# Patient Record
Sex: FEMALE | Race: WHITE | NOT HISPANIC OR LATINO | Employment: OTHER | URBAN - NONMETROPOLITAN AREA
[De-identification: names, ages, dates, MRNs, and addresses within clinical notes are randomized per-mention and may not be internally consistent; named-entity substitution may affect disease eponyms.]

---

## 2023-07-17 LAB
ALANINE AMINOTRANSFERASE (SGPT) (U/L) IN SER/PLAS: 30 U/L (ref 7–45)
ALBUMIN (G/DL) IN SER/PLAS: 4.7 G/DL (ref 3.4–5)
ALKALINE PHOSPHATASE (U/L) IN SER/PLAS: 51 U/L (ref 33–136)
ANION GAP IN SER/PLAS: 15 MMOL/L (ref 10–20)
ASPARTATE AMINOTRANSFERASE (SGOT) (U/L) IN SER/PLAS: 28 U/L (ref 9–39)
BASOPHILS (10*3/UL) IN BLOOD BY AUTOMATED COUNT: 0.03 X10E9/L (ref 0–0.1)
BASOPHILS/100 LEUKOCYTES IN BLOOD BY AUTOMATED COUNT: 0.8 % (ref 0–2)
BILIRUBIN TOTAL (MG/DL) IN SER/PLAS: 0.5 MG/DL (ref 0–1.2)
C REACTIVE PROTEIN (MG/L) IN SER/PLAS: <0.1 MG/DL
CALCIDIOL (25 OH VITAMIN D3) (NG/ML) IN SER/PLAS: 63 NG/ML
CALCIUM (MG/DL) IN SER/PLAS: 9.6 MG/DL (ref 8.6–10.3)
CARBON DIOXIDE, TOTAL (MMOL/L) IN SER/PLAS: 25 MMOL/L (ref 21–32)
CHLORIDE (MMOL/L) IN SER/PLAS: 108 MMOL/L (ref 98–107)
CHOLESTEROL (MG/DL) IN SER/PLAS: 174 MG/DL (ref 0–199)
CHOLESTEROL IN HDL (MG/DL) IN SER/PLAS: 53 MG/DL
CHOLESTEROL/HDL RATIO: 3.3
CREATININE (MG/DL) IN SER/PLAS: 0.77 MG/DL (ref 0.5–1.05)
EOSINOPHILS (10*3/UL) IN BLOOD BY AUTOMATED COUNT: 0.08 X10E9/L (ref 0–0.7)
EOSINOPHILS/100 LEUKOCYTES IN BLOOD BY AUTOMATED COUNT: 2.3 % (ref 0–6)
ERYTHROCYTE DISTRIBUTION WIDTH (RATIO) BY AUTOMATED COUNT: 13.3 % (ref 11.5–14.5)
ERYTHROCYTE MEAN CORPUSCULAR HEMOGLOBIN CONCENTRATION (G/DL) BY AUTOMATED: 32.2 G/DL (ref 32–36)
ERYTHROCYTE MEAN CORPUSCULAR VOLUME (FL) BY AUTOMATED COUNT: 93 FL (ref 80–100)
ERYTHROCYTES (10*6/UL) IN BLOOD BY AUTOMATED COUNT: 5.29 X10E12/L (ref 4–5.2)
ESTIMATED AVERAGE GLUCOSE FOR HBA1C: 108 MG/DL
GFR FEMALE: 85 ML/MIN/1.73M2
GLUCOSE (MG/DL) IN SER/PLAS: 90 MG/DL (ref 74–99)
HEMATOCRIT (%) IN BLOOD BY AUTOMATED COUNT: 49.1 % (ref 36–46)
HEMOGLOBIN (G/DL) IN BLOOD: 15.8 G/DL (ref 12–16)
HEMOGLOBIN A1C/HEMOGLOBIN TOTAL IN BLOOD: 5.4 %
IMMATURE GRANULOCYTES/100 LEUKOCYTES IN BLOOD BY AUTOMATED COUNT: 0.3 % (ref 0–0.9)
LDL: 96 MG/DL (ref 0–99)
LEUKOCYTES (10*3/UL) IN BLOOD BY AUTOMATED COUNT: 3.5 X10E9/L (ref 4.4–11.3)
LYMPHOCYTES (10*3/UL) IN BLOOD BY AUTOMATED COUNT: 1.32 X10E9/L (ref 1.2–4.8)
LYMPHOCYTES/100 LEUKOCYTES IN BLOOD BY AUTOMATED COUNT: 37.4 % (ref 13–44)
MONOCYTES (10*3/UL) IN BLOOD BY AUTOMATED COUNT: 0.2 X10E9/L (ref 0.1–1)
MONOCYTES/100 LEUKOCYTES IN BLOOD BY AUTOMATED COUNT: 5.7 % (ref 2–10)
NEUTROPHILS (10*3/UL) IN BLOOD BY AUTOMATED COUNT: 1.89 X10E9/L (ref 1.2–7.7)
NEUTROPHILS/100 LEUKOCYTES IN BLOOD BY AUTOMATED COUNT: 53.5 % (ref 40–80)
PLATELETS (10*3/UL) IN BLOOD AUTOMATED COUNT: 154 X10E9/L (ref 150–450)
POTASSIUM (MMOL/L) IN SER/PLAS: 4.1 MMOL/L (ref 3.5–5.3)
PROTEIN TOTAL: 7.1 G/DL (ref 6.4–8.2)
SEDIMENTATION RATE, ERYTHROCYTE: 5 MM/H (ref 0–30)
SODIUM (MMOL/L) IN SER/PLAS: 144 MMOL/L (ref 136–145)
THYROTROPIN (MIU/L) IN SER/PLAS BY DETECTION LIMIT <= 0.05 MIU/L: 1.31 MIU/L (ref 0.44–3.98)
TRIGLYCERIDE (MG/DL) IN SER/PLAS: 127 MG/DL (ref 0–149)
UREA NITROGEN (MG/DL) IN SER/PLAS: 12 MG/DL (ref 6–23)
VLDL: 25 MG/DL (ref 0–40)

## 2023-07-25 PROBLEM — M81.0 OSTEOPOROSIS: Status: ACTIVE | Noted: 2023-07-25

## 2023-07-25 PROBLEM — J02.9 ACUTE PHARYNGITIS: Status: ACTIVE | Noted: 2023-07-25

## 2023-07-25 PROBLEM — J30.9 ALLERGIC RHINITIS: Status: ACTIVE | Noted: 2023-07-25

## 2023-07-25 PROBLEM — K21.9 GERD (GASTROESOPHAGEAL REFLUX DISEASE): Status: ACTIVE | Noted: 2023-07-25

## 2023-07-25 PROBLEM — L30.9 DERMATITIS: Status: ACTIVE | Noted: 2023-07-25

## 2023-07-25 PROBLEM — R92.8 ABNORMAL MAMMOGRAM OF LEFT BREAST: Status: ACTIVE | Noted: 2023-07-25

## 2023-07-25 PROBLEM — E55.9 VITAMIN D DEFICIENCY: Status: ACTIVE | Noted: 2023-07-25

## 2023-07-25 PROBLEM — J20.9 ACUTE BRONCHITIS: Status: ACTIVE | Noted: 2023-07-25

## 2023-07-25 PROBLEM — R39.9 UTI SYMPTOMS: Status: ACTIVE | Noted: 2023-07-25

## 2023-07-25 PROBLEM — E78.00 HYPERCHOLESTEROLEMIA: Status: ACTIVE | Noted: 2023-07-25

## 2023-07-25 PROBLEM — M06.9 RA (RHEUMATOID ARTHRITIS) (MULTI): Status: ACTIVE | Noted: 2023-07-25

## 2023-07-25 PROBLEM — J10.1 INFLUENZA B: Status: ACTIVE | Noted: 2023-07-25

## 2023-07-25 PROBLEM — J01.90 ACUTE SINUSITIS: Status: ACTIVE | Noted: 2023-07-25

## 2023-07-25 PROBLEM — J02.9 SORE THROAT: Status: ACTIVE | Noted: 2023-07-25

## 2023-07-25 PROBLEM — R73.9 HYPERGLYCEMIA: Status: ACTIVE | Noted: 2023-07-25

## 2023-07-25 RX ORDER — ASCORBATE CALCIUM 500 MG
1 TABLET ORAL DAILY
COMMUNITY
Start: 2021-05-10 | End: 2024-02-12 | Stop reason: ALTCHOICE

## 2023-07-25 RX ORDER — LORATADINE 10 MG/1
1 TABLET ORAL DAILY
COMMUNITY
Start: 2023-05-15

## 2023-07-25 RX ORDER — MULTIVITAMIN
TABLET ORAL
COMMUNITY

## 2023-07-25 RX ORDER — METRONIDAZOLE 10 MG/G
GEL TOPICAL
COMMUNITY
Start: 2020-08-31

## 2023-07-25 RX ORDER — ASCORBIC ACID 250 MG
TABLET ORAL
COMMUNITY

## 2023-07-25 RX ORDER — PNEUMOCOCCAL VACCINE POLYVALENT 25; 25; 25; 25; 25; 25; 25; 25; 25; 25; 25; 25; 25; 25; 25; 25; 25; 25; 25; 25; 25; 25; 25 UG/.5ML; UG/.5ML; UG/.5ML; UG/.5ML; UG/.5ML; UG/.5ML; UG/.5ML; UG/.5ML; UG/.5ML; UG/.5ML; UG/.5ML; UG/.5ML; UG/.5ML; UG/.5ML; UG/.5ML; UG/.5ML; UG/.5ML; UG/.5ML; UG/.5ML; UG/.5ML; UG/.5ML; UG/.5ML; UG/.5ML
INJECTION, SOLUTION INTRAMUSCULAR; SUBCUTANEOUS
COMMUNITY
Start: 2023-03-03

## 2023-07-25 RX ORDER — HYDROXYCHLOROQUINE SULFATE 200 MG/1
1 TABLET, FILM COATED ORAL DAILY
COMMUNITY
Start: 2013-04-01 | End: 2024-05-13 | Stop reason: SDUPTHER

## 2023-07-25 RX ORDER — VIT C/E/ZN/COPPR/LUTEIN/ZEAXAN 250MG-90MG
CAPSULE ORAL
COMMUNITY

## 2023-07-25 RX ORDER — ROSUVASTATIN CALCIUM 40 MG/1
1 TABLET, COATED ORAL DAILY
COMMUNITY
Start: 2013-04-01 | End: 2023-08-13 | Stop reason: SDUPTHER

## 2023-07-25 RX ORDER — OMEPRAZOLE 40 MG/1
1 CAPSULE, DELAYED RELEASE ORAL DAILY
COMMUNITY
Start: 2015-11-30 | End: 2023-08-10 | Stop reason: SDUPTHER

## 2023-08-10 ENCOUNTER — OFFICE VISIT (OUTPATIENT)
Dept: PRIMARY CARE | Facility: CLINIC | Age: 63
End: 2023-08-10
Payer: MEDICARE

## 2023-08-10 VITALS
HEART RATE: 82 BPM | SYSTOLIC BLOOD PRESSURE: 162 MMHG | DIASTOLIC BLOOD PRESSURE: 88 MMHG | OXYGEN SATURATION: 97 % | WEIGHT: 153.8 LBS | BODY MASS INDEX: 27.24 KG/M2

## 2023-08-10 DIAGNOSIS — E78.00 HYPERCHOLESTEROLEMIA: ICD-10-CM

## 2023-08-10 DIAGNOSIS — M06.9 RHEUMATOID ARTHRITIS, INVOLVING UNSPECIFIED SITE, UNSPECIFIED WHETHER RHEUMATOID FACTOR PRESENT (MULTI): ICD-10-CM

## 2023-08-10 DIAGNOSIS — E55.9 VITAMIN D DEFICIENCY: ICD-10-CM

## 2023-08-10 DIAGNOSIS — I10 HYPERTENSION, UNSPECIFIED TYPE: Primary | ICD-10-CM

## 2023-08-10 DIAGNOSIS — R92.30 DENSE BREAST TISSUE ON MAMMOGRAM: ICD-10-CM

## 2023-08-10 DIAGNOSIS — M81.0 OSTEOPOROSIS, UNSPECIFIED OSTEOPOROSIS TYPE, UNSPECIFIED PATHOLOGICAL FRACTURE PRESENCE: ICD-10-CM

## 2023-08-10 DIAGNOSIS — K21.9 GASTROESOPHAGEAL REFLUX DISEASE, UNSPECIFIED WHETHER ESOPHAGITIS PRESENT: ICD-10-CM

## 2023-08-10 PROCEDURE — 1160F RVW MEDS BY RX/DR IN RCRD: CPT | Performed by: INTERNAL MEDICINE

## 2023-08-10 PROCEDURE — 1159F MED LIST DOCD IN RCRD: CPT | Performed by: INTERNAL MEDICINE

## 2023-08-10 PROCEDURE — 99214 OFFICE O/P EST MOD 30 MIN: CPT | Performed by: INTERNAL MEDICINE

## 2023-08-10 PROCEDURE — 3079F DIAST BP 80-89 MM HG: CPT | Performed by: INTERNAL MEDICINE

## 2023-08-10 PROCEDURE — G0446 INTENS BEHAVE THER CARDIO DX: HCPCS | Performed by: INTERNAL MEDICINE

## 2023-08-10 PROCEDURE — 1036F TOBACCO NON-USER: CPT | Performed by: INTERNAL MEDICINE

## 2023-08-10 PROCEDURE — 3077F SYST BP >= 140 MM HG: CPT | Performed by: INTERNAL MEDICINE

## 2023-08-10 PROCEDURE — 3008F BODY MASS INDEX DOCD: CPT | Performed by: INTERNAL MEDICINE

## 2023-08-10 RX ORDER — OMEPRAZOLE 40 MG/1
40 CAPSULE, DELAYED RELEASE ORAL DAILY
Qty: 90 CAPSULE | Refills: 0 | Status: SHIPPED | OUTPATIENT
Start: 2023-08-10

## 2023-08-10 NOTE — PROGRESS NOTES
Subjective   Patient ID: Zeynep Winters is a 63 y.o. female who presents for Med Management, Hyperlipidemia, GERD, and Rheumatoid Arthritis.  Hyperlipidemia    GERD  Routine follow  up    High bp at rheum office    face rosacea   sees derm     GERD stable on rx no side effects     RA stable on rx no side effects  sees rheum     cholesterol stable on rx no side effects     allergies stable on rx no side effects     vit D stable on rx no side effects     diet / exercise rev'd     check mammogram, dexa and labs    Review of Systems   All other systems reviewed and are negative.      Objective   /88   Pulse 82   Wt 69.8 kg (153 lb 12.8 oz)   SpO2 97%   BMI 27.24 kg/m²   Lab Results   Component Value Date    WBC 3.5 (L) 07/17/2023    HGB 15.8 07/17/2023    HCT 49.1 (H) 07/17/2023     07/17/2023    CHOL 174 07/17/2023    TRIG 127 07/17/2023    HDL 53.0 07/17/2023    ALT 30 07/17/2023    AST 28 07/17/2023     07/17/2023    K 4.1 07/17/2023     (H) 07/17/2023    CREATININE 0.77 07/17/2023    BUN 12 07/17/2023    CO2 25 07/17/2023    TSH 1.31 07/17/2023    HGBA1C 5.4 07/17/2023           Physical Exam  Vitals reviewed.   Constitutional:       Appearance: Normal appearance. She is normal weight.   HENT:      Head: Normocephalic and atraumatic.      Mouth/Throat:      Pharynx: No posterior oropharyngeal erythema.   Eyes:      General: No scleral icterus.     Conjunctiva/sclera: Conjunctivae normal.      Pupils: Pupils are equal, round, and reactive to light.   Cardiovascular:      Rate and Rhythm: Normal rate and regular rhythm.      Heart sounds: Normal heart sounds.   Pulmonary:      Effort: No respiratory distress.      Breath sounds: No wheezing.   Abdominal:      General: Abdomen is flat. Bowel sounds are normal. There is no distension.      Palpations: Abdomen is soft. There is no mass.      Tenderness: There is no abdominal tenderness. There is no rebound.   Musculoskeletal:         General:  Normal range of motion.      Cervical back: Normal range of motion and neck supple.   Skin:     General: Skin is warm and dry.   Neurological:      General: No focal deficit present.      Mental Status: She is alert and oriented to person, place, and time. Mental status is at baseline.   Psychiatric:         Mood and Affect: Mood normal.         Behavior: Behavior normal.         Thought Content: Thought content normal.         Judgment: Judgment normal.       Problem List Items Addressed This Visit          Cardiac and Vasculature    Hypercholesterolemia       Endocrine/Metabolic    Vitamin D deficiency       Gastrointestinal and Abdominal    GERD (gastroesophageal reflux disease)    Relevant Medications    omeprazole (PriLOSEC) 40 mg DR capsule       Multi-system (Lupus, Sarcoid)    RA (rheumatoid arthritis) (CMS/[facility])     Other Visit Diagnoses       Hypertension, unspecified type    -  Primary    BMI 27.0-27.9,adult              Assessment/Plan         Labs, mammogram rev'd    High bp at rheum office  Lifestyle changes discussed  Follow closely    face rosacea   sees derm     GERD stable on rx no side effects     RA stable on rx no side effects  osteoporosis  sees rheum     cholesterol stable on rx no side effects  I spent 15 minutes face-to-face with this individual discussing their cardiovascular risk and behavioral therapies of nutritional choices, exercise, and elimination of habits contributing to risk. We agreed on plan how they may be able to reduce their current cardiovascular risk.  Patient 10 year cardiac risk estimate calculates :    8.2   %    allergies stable on rx no side effects     vit D stable on rx no side effects    Dense breast tissue on mammogram rev'd  MRI offered pt deferred for now     diet / exercise rev'd    Mammogram 4-23  Dexa 4-23  Colonoscopy at 56 yrs  due at 66 yrs  CT chest lung cancer screening n/a  GYN n/a  immunizations rev'd  BMI 27.2    Medicare physical 2-23    Follow up 3  weeks

## 2023-08-12 DIAGNOSIS — E78.00 HYPERCHOLESTEROLEMIA: Primary | ICD-10-CM

## 2023-08-13 RX ORDER — ROSUVASTATIN CALCIUM 40 MG/1
40 TABLET, COATED ORAL DAILY
Qty: 90 TABLET | Refills: 0 | Status: SHIPPED | OUTPATIENT
Start: 2023-08-13 | End: 2023-12-28 | Stop reason: SDUPTHER

## 2023-09-12 ENCOUNTER — OFFICE VISIT (OUTPATIENT)
Dept: PRIMARY CARE | Facility: CLINIC | Age: 63
End: 2023-09-12
Payer: MEDICARE

## 2023-09-12 VITALS
DIASTOLIC BLOOD PRESSURE: 104 MMHG | OXYGEN SATURATION: 98 % | SYSTOLIC BLOOD PRESSURE: 186 MMHG | BODY MASS INDEX: 27.6 KG/M2 | HEART RATE: 80 BPM | WEIGHT: 155.8 LBS

## 2023-09-12 DIAGNOSIS — M06.9 RHEUMATOID ARTHRITIS INVOLVING MULTIPLE SITES, UNSPECIFIED WHETHER RHEUMATOID FACTOR PRESENT (MULTI): ICD-10-CM

## 2023-09-12 DIAGNOSIS — K21.9 GASTROESOPHAGEAL REFLUX DISEASE WITHOUT ESOPHAGITIS: ICD-10-CM

## 2023-09-12 DIAGNOSIS — E55.9 VITAMIN D DEFICIENCY: ICD-10-CM

## 2023-09-12 DIAGNOSIS — F41.9 ANXIETY: ICD-10-CM

## 2023-09-12 DIAGNOSIS — E78.00 HYPERCHOLESTEREMIA: ICD-10-CM

## 2023-09-12 DIAGNOSIS — I10 HYPERTENSION, UNSPECIFIED TYPE: Primary | ICD-10-CM

## 2023-09-12 PROCEDURE — 99214 OFFICE O/P EST MOD 30 MIN: CPT | Performed by: INTERNAL MEDICINE

## 2023-09-12 PROCEDURE — 1160F RVW MEDS BY RX/DR IN RCRD: CPT | Performed by: INTERNAL MEDICINE

## 2023-09-12 PROCEDURE — 3080F DIAST BP >= 90 MM HG: CPT | Performed by: INTERNAL MEDICINE

## 2023-09-12 PROCEDURE — 3008F BODY MASS INDEX DOCD: CPT | Performed by: INTERNAL MEDICINE

## 2023-09-12 PROCEDURE — 1036F TOBACCO NON-USER: CPT | Performed by: INTERNAL MEDICINE

## 2023-09-12 PROCEDURE — 1159F MED LIST DOCD IN RCRD: CPT | Performed by: INTERNAL MEDICINE

## 2023-09-12 PROCEDURE — 3077F SYST BP >= 140 MM HG: CPT | Performed by: INTERNAL MEDICINE

## 2023-09-12 RX ORDER — SERTRALINE HYDROCHLORIDE 50 MG/1
TABLET, FILM COATED ORAL
Qty: 30 TABLET | Refills: 0 | Status: SHIPPED | OUTPATIENT
Start: 2023-09-12 | End: 2023-10-10 | Stop reason: SDUPTHER

## 2023-09-12 RX ORDER — ESCITALOPRAM OXALATE 10 MG/1
10 TABLET ORAL DAILY
Qty: 30 TABLET | Refills: 0 | Status: CANCELLED | OUTPATIENT
Start: 2023-09-12 | End: 2023-12-11

## 2023-09-12 NOTE — PROGRESS NOTES
Subjective   Patient ID: Zeynep Winters is a 63 y.o. female who presents for Med Management, Hypertension, and Rheumatoid Arthritis.  HPI  Follow up bp    Hypertension  Home readings rev'd and acceptable  Gets anxious easily     face rosacea   sees derm     GERD stable on rx no side effects     RA stable on rx no side effects  osteoporosis  sees rheum     cholesterol stable on rx no side effects     vit D stable on rx no side effects     Dense breast tissue on mammogram rev'd  MRI offered pt deferred for now     diet / exercise rev'd     Mammogram 4-23  Dexa 4-23  Colonoscopy at 56 yrs  due at 66 yrs  CT chest lung cancer screening n/a  GYN n/a  immunizations rev'd  BMI 27.2     Medicare physical 2-23    Review of Systems   All other systems reviewed and are negative.      Objective   BP (!) 186/104   Pulse 80   Wt 70.7 kg (155 lb 12.8 oz)   SpO2 98%   BMI 27.60 kg/m²   Lab Results   Component Value Date    WBC 3.5 (L) 07/17/2023    HGB 15.8 07/17/2023    HCT 49.1 (H) 07/17/2023     07/17/2023    CHOL 174 07/17/2023    TRIG 127 07/17/2023    HDL 53.0 07/17/2023    ALT 30 07/17/2023    AST 28 07/17/2023     07/17/2023    K 4.1 07/17/2023     (H) 07/17/2023    CREATININE 0.77 07/17/2023    BUN 12 07/17/2023    CO2 25 07/17/2023    TSH 1.31 07/17/2023    HGBA1C 5.4 07/17/2023           Physical Exam  Vitals reviewed.   Constitutional:       Appearance: Normal appearance. She is normal weight.   HENT:      Head: Normocephalic and atraumatic.      Mouth/Throat:      Pharynx: No posterior oropharyngeal erythema.   Eyes:      General: No scleral icterus.     Conjunctiva/sclera: Conjunctivae normal.      Pupils: Pupils are equal, round, and reactive to light.   Cardiovascular:      Rate and Rhythm: Normal rate and regular rhythm.      Heart sounds: Normal heart sounds.   Pulmonary:      Effort: No respiratory distress.      Breath sounds: No wheezing.   Abdominal:      General: Abdomen is flat. Bowel  sounds are normal. There is no distension.      Palpations: Abdomen is soft. There is no mass.      Tenderness: There is no abdominal tenderness. There is no rebound.   Musculoskeletal:         General: Normal range of motion.      Cervical back: Normal range of motion and neck supple.   Skin:     General: Skin is warm and dry.   Neurological:      General: No focal deficit present.      Mental Status: She is alert and oriented to person, place, and time. Mental status is at baseline.   Psychiatric:         Mood and Affect: Mood normal.         Behavior: Behavior normal.         Thought Content: Thought content normal.         Judgment: Judgment normal.         Problem List Items Addressed This Visit          Endocrine/Metabolic    Vitamin D deficiency       Gastrointestinal and Abdominal    GERD (gastroesophageal reflux disease)       Multi-system (Lupus, Sarcoid)    RA (rheumatoid arthritis) (CMS/[facility])     Other Visit Diagnoses       Hypertension, unspecified type    -  Primary    Anxiety        Relevant Medications    sertraline (Zoloft) 50 mg tablet    Hypercholesteremia        BMI 27.0-27.9,adult              Assessment/Plan     Follow up bp    Hypertension  Home readings rev'd and acceptable  Gets anxious easily  Start zoloft 50mg 1/2 tab daily x 4 days then 1 a day  Risk / benefits rev'd  Follow bp's closely     face rosacea   sees derm     GERD stable on rx no side effects     RA stable on rx no side effects  osteoporosis  sees rheum     cholesterol stable on rx no side effects     vit D stable on rx no side effects     Dense breast tissue on mammogram rev'd  MRI offered pt deferred for now     diet / exercise rev'd     Mammogram 4-23  Dexa 4-23  Colonoscopy at 56 yrs  due at 66 yrs  CT chest lung cancer screening n/a  GYN n/a  immunizations rev'd  BMI 27.2     Medicare physical 2-23    Follow up 3 weeks

## 2023-10-10 ENCOUNTER — OFFICE VISIT (OUTPATIENT)
Dept: PRIMARY CARE | Facility: CLINIC | Age: 63
End: 2023-10-10
Payer: MEDICARE

## 2023-10-10 VITALS
HEART RATE: 64 BPM | DIASTOLIC BLOOD PRESSURE: 94 MMHG | SYSTOLIC BLOOD PRESSURE: 186 MMHG | OXYGEN SATURATION: 97 % | WEIGHT: 156.2 LBS | BODY MASS INDEX: 27.67 KG/M2

## 2023-10-10 DIAGNOSIS — E55.9 VITAMIN D DEFICIENCY: ICD-10-CM

## 2023-10-10 DIAGNOSIS — F41.9 ANXIETY: ICD-10-CM

## 2023-10-10 DIAGNOSIS — K21.9 GASTROESOPHAGEAL REFLUX DISEASE WITHOUT ESOPHAGITIS: ICD-10-CM

## 2023-10-10 DIAGNOSIS — M06.9 RHEUMATOID ARTHRITIS INVOLVING MULTIPLE SITES, UNSPECIFIED WHETHER RHEUMATOID FACTOR PRESENT (MULTI): ICD-10-CM

## 2023-10-10 DIAGNOSIS — I10 HYPERTENSION, UNSPECIFIED TYPE: Primary | ICD-10-CM

## 2023-10-10 DIAGNOSIS — E78.00 HYPERCHOLESTEREMIA: ICD-10-CM

## 2023-10-10 PROCEDURE — 1160F RVW MEDS BY RX/DR IN RCRD: CPT | Performed by: INTERNAL MEDICINE

## 2023-10-10 PROCEDURE — 3080F DIAST BP >= 90 MM HG: CPT | Performed by: INTERNAL MEDICINE

## 2023-10-10 PROCEDURE — 1036F TOBACCO NON-USER: CPT | Performed by: INTERNAL MEDICINE

## 2023-10-10 PROCEDURE — 1159F MED LIST DOCD IN RCRD: CPT | Performed by: INTERNAL MEDICINE

## 2023-10-10 PROCEDURE — 3008F BODY MASS INDEX DOCD: CPT | Performed by: INTERNAL MEDICINE

## 2023-10-10 PROCEDURE — 99214 OFFICE O/P EST MOD 30 MIN: CPT | Performed by: INTERNAL MEDICINE

## 2023-10-10 PROCEDURE — 3077F SYST BP >= 140 MM HG: CPT | Performed by: INTERNAL MEDICINE

## 2023-10-10 RX ORDER — SERTRALINE HYDROCHLORIDE 50 MG/1
TABLET, FILM COATED ORAL
Qty: 90 TABLET | Refills: 1 | Status: SHIPPED | OUTPATIENT
Start: 2023-10-10 | End: 2024-02-12 | Stop reason: SDUPTHER

## 2023-10-10 ASSESSMENT — ENCOUNTER SYMPTOMS: HYPERTENSION: 1

## 2023-10-10 NOTE — PROGRESS NOTES
Subjective   Patient ID: Zeynep Winters is a 63 y.o. female who presents for Med Management, Hypertension, and Hyperlipidemia.  Hypertension    Hyperlipidemia      Follow up bp, zoloft     Hypertension  Home readings rev'd and acceptable  Gets anxious easily  Continue zoloft feels better  no side effects     face rosacea   sees derm     GERD stable on rx no side effects     RA stable on rx no side effects  osteoporosis  sees rheum     cholesterol stable on rx no side effects     vit D stable on rx no side effects     Dense breast tissue on mammogram rev'd  MRI offered pt deferred for now     diet / exercise rev'd     Mammogram 4-23  Dexa 4-23  Colonoscopy at 56 yrs  due at 66 yrs  CT chest lung cancer screening n/a  GYN n/a  immunizations rev'd  BMI 27.6     Medicare physical 2-23    Review of Systems   All other systems reviewed and are negative.      Objective   BP (!) 186/94   Pulse 64   Wt 70.9 kg (156 lb 3.2 oz)   SpO2 97%   BMI 27.67 kg/m²   Lab Results   Component Value Date    WBC 3.5 (L) 07/17/2023    HGB 15.8 07/17/2023    HCT 49.1 (H) 07/17/2023     07/17/2023    CHOL 174 07/17/2023    TRIG 127 07/17/2023    HDL 53.0 07/17/2023    ALT 30 07/17/2023    AST 28 07/17/2023     07/17/2023    K 4.1 07/17/2023     (H) 07/17/2023    CREATININE 0.77 07/17/2023    BUN 12 07/17/2023    CO2 25 07/17/2023    TSH 1.31 07/17/2023    HGBA1C 5.4 07/17/2023           Physical Exam  Vitals reviewed.   Constitutional:       Appearance: Normal appearance. She is normal weight.   HENT:      Head: Normocephalic and atraumatic.      Mouth/Throat:      Pharynx: No posterior oropharyngeal erythema.   Eyes:      General: No scleral icterus.     Conjunctiva/sclera: Conjunctivae normal.      Pupils: Pupils are equal, round, and reactive to light.   Cardiovascular:      Rate and Rhythm: Normal rate and regular rhythm.      Heart sounds: Normal heart sounds.   Pulmonary:      Effort: No respiratory distress.       Breath sounds: No wheezing.   Abdominal:      General: Abdomen is flat. Bowel sounds are normal. There is no distension.      Palpations: Abdomen is soft. There is no mass.      Tenderness: There is no abdominal tenderness. There is no rebound.   Musculoskeletal:         General: Normal range of motion.      Cervical back: Normal range of motion and neck supple.   Skin:     General: Skin is warm and dry.   Neurological:      General: No focal deficit present.      Mental Status: She is alert and oriented to person, place, and time. Mental status is at baseline.   Psychiatric:         Mood and Affect: Mood normal.         Behavior: Behavior normal.         Thought Content: Thought content normal.         Judgment: Judgment normal.         Problem List Items Addressed This Visit             ICD-10-CM       Endocrine/Metabolic    Vitamin D deficiency E55.9       Gastrointestinal and Abdominal    GERD (gastroesophageal reflux disease) K21.9       Multi-system (Lupus, Sarcoid)    RA (rheumatoid arthritis) (CMS/[facility]) M06.9     Other Visit Diagnoses         Codes    Hypertension, unspecified type    -  Primary I10    Anxiety     F41.9    Hypercholesteremia     E78.00    BMI 27.0-27.9,adult     Z68.27          Assessment/Plan     Follow up bp, zoloft     Hypertension  Home readings rev'd and acceptable  Gets anxious easily  Continue zoloft feels better  no side effects     face rosacea   sees derm     GERD stable on rx no side effects     RA stable on rx no side effects  osteoporosis  sees rheum     cholesterol stable on rx no side effects     vit D stable on rx no side effects     Dense breast tissue on mammogram rev'd  MRI offered pt deferred for now     diet / exercise rev'd     Mammogram 4-23  Dexa 4-23  Colonoscopy at 56 yrs  due at 66 yrs  CT chest lung cancer screening n/a  GYN n/a  immunizations rev'd  BMI 27.6     Medicare physical 2-23    Follow up 2/24  medicare physical / EKG

## 2023-12-28 DIAGNOSIS — E78.00 HYPERCHOLESTEROLEMIA: ICD-10-CM

## 2023-12-28 RX ORDER — ROSUVASTATIN CALCIUM 40 MG/1
40 TABLET, COATED ORAL DAILY
Qty: 90 TABLET | Refills: 0 | Status: SHIPPED | OUTPATIENT
Start: 2023-12-28 | End: 2024-02-12 | Stop reason: SDUPTHER

## 2024-02-12 ENCOUNTER — OFFICE VISIT (OUTPATIENT)
Dept: PRIMARY CARE | Facility: CLINIC | Age: 64
End: 2024-02-12
Payer: MEDICARE

## 2024-02-12 VITALS
BODY MASS INDEX: 27.71 KG/M2 | WEIGHT: 156.4 LBS | DIASTOLIC BLOOD PRESSURE: 86 MMHG | OXYGEN SATURATION: 96 % | HEIGHT: 63 IN | HEART RATE: 78 BPM | SYSTOLIC BLOOD PRESSURE: 132 MMHG

## 2024-02-12 DIAGNOSIS — Z12.31 ENCOUNTER FOR SCREENING MAMMOGRAM FOR MALIGNANT NEOPLASM OF BREAST: ICD-10-CM

## 2024-02-12 DIAGNOSIS — E55.9 VITAMIN D DEFICIENCY: ICD-10-CM

## 2024-02-12 DIAGNOSIS — E78.00 HYPERCHOLESTEREMIA: ICD-10-CM

## 2024-02-12 DIAGNOSIS — I10 HYPERTENSION, UNSPECIFIED TYPE: ICD-10-CM

## 2024-02-12 DIAGNOSIS — Z00.00 ENCOUNTER FOR SUBSEQUENT ANNUAL WELLNESS VISIT (AWV) IN MEDICARE PATIENT: Primary | ICD-10-CM

## 2024-02-12 DIAGNOSIS — M06.9 RHEUMATOID ARTHRITIS INVOLVING MULTIPLE SITES, UNSPECIFIED WHETHER RHEUMATOID FACTOR PRESENT (MULTI): ICD-10-CM

## 2024-02-12 DIAGNOSIS — F41.9 ANXIETY: ICD-10-CM

## 2024-02-12 DIAGNOSIS — R92.30 DENSE BREAST TISSUE ON MAMMOGRAM, UNSPECIFIED TYPE: ICD-10-CM

## 2024-02-12 PROCEDURE — 93000 ELECTROCARDIOGRAM COMPLETE: CPT | Performed by: INTERNAL MEDICINE

## 2024-02-12 PROCEDURE — 99214 OFFICE O/P EST MOD 30 MIN: CPT | Performed by: INTERNAL MEDICINE

## 2024-02-12 PROCEDURE — G0439 PPPS, SUBSEQ VISIT: HCPCS | Performed by: INTERNAL MEDICINE

## 2024-02-12 PROCEDURE — 3075F SYST BP GE 130 - 139MM HG: CPT | Performed by: INTERNAL MEDICINE

## 2024-02-12 PROCEDURE — 1124F ACP DISCUSS-NO DSCNMKR DOCD: CPT | Performed by: INTERNAL MEDICINE

## 2024-02-12 PROCEDURE — 1159F MED LIST DOCD IN RCRD: CPT | Performed by: INTERNAL MEDICINE

## 2024-02-12 PROCEDURE — 1170F FXNL STATUS ASSESSED: CPT | Performed by: INTERNAL MEDICINE

## 2024-02-12 PROCEDURE — 3079F DIAST BP 80-89 MM HG: CPT | Performed by: INTERNAL MEDICINE

## 2024-02-12 PROCEDURE — 3008F BODY MASS INDEX DOCD: CPT | Performed by: INTERNAL MEDICINE

## 2024-02-12 PROCEDURE — 1160F RVW MEDS BY RX/DR IN RCRD: CPT | Performed by: INTERNAL MEDICINE

## 2024-02-12 PROCEDURE — 1036F TOBACCO NON-USER: CPT | Performed by: INTERNAL MEDICINE

## 2024-02-12 RX ORDER — SERTRALINE HYDROCHLORIDE 50 MG/1
TABLET, FILM COATED ORAL
Qty: 90 TABLET | Refills: 1 | Status: SHIPPED | OUTPATIENT
Start: 2024-02-12

## 2024-02-12 RX ORDER — ROSUVASTATIN CALCIUM 40 MG/1
40 TABLET, COATED ORAL DAILY
Qty: 90 TABLET | Refills: 1 | Status: SHIPPED | OUTPATIENT
Start: 2024-02-12

## 2024-02-12 ASSESSMENT — ENCOUNTER SYMPTOMS
OCCASIONAL FEELINGS OF UNSTEADINESS: 0
LOSS OF SENSATION IN FEET: 0
DEPRESSION: 0

## 2024-02-12 ASSESSMENT — PATIENT HEALTH QUESTIONNAIRE - PHQ9
2. FEELING DOWN, DEPRESSED OR HOPELESS: NOT AT ALL
SUM OF ALL RESPONSES TO PHQ9 QUESTIONS 1 AND 2: 3
1. LITTLE INTEREST OR PLEASURE IN DOING THINGS: NEARLY EVERY DAY
1. LITTLE INTEREST OR PLEASURE IN DOING THINGS: NOT AT ALL

## 2024-02-12 ASSESSMENT — ACTIVITIES OF DAILY LIVING (ADL)
BATHING: INDEPENDENT
DOING_HOUSEWORK: INDEPENDENT
MANAGING_FINANCES: INDEPENDENT
TAKING_MEDICATION: INDEPENDENT
DRESSING: INDEPENDENT
GROCERY_SHOPPING: INDEPENDENT

## 2024-02-13 NOTE — PROGRESS NOTES
"Subjective   Reason for Visit: Zeynep Winters is an 63 y.o. female here for a Medicare Wellness visit / follow up    Past Medical, Surgical, and Family History reviewed and updated in chart.       Reviewed all medications by prescribing practitioner or clinical pharmacist (such as prescriptions, OTCs, herbal therapies and supplements) and documented in the medical record.     HPI    Medicare wellness    mammogram 4-23  Dexa 4-23  Colonoscopy at 56 yrs  due at 66 yrs  CT chest lung cancer screening n/a  GYN n/a  immunizations rev'd  BMI 27.7    Follow up    Hypertension on diet  Home readings rev'd and acceptable  Gets anxious easily  Continue zoloft feels better  no side effects     face rosacea   sees derm     GERD stable on rx no side effects     RA stable on rx no side effects  osteoporosis  sees rheum     cholesterol stable on rx no side effects     vit D stable on rx no side effects     Dense breast tissue on mammogram rev'd  Fast MRI ordered     diet / exercise rev'd    Patient Care Team:  Kathia Harvey MD as PCP - General  Kathia Harvey MD as PCP - [facility]     Review of Systems   All other systems reviewed and are negative.      Objective   Vitals:  /86   Pulse 78   Ht 1.6 m (5' 3\")   Wt 70.9 kg (156 lb 6.4 oz)   SpO2 96%   BMI 27.71 kg/m²       Physical Exam  Vitals reviewed.   Constitutional:       Appearance: Normal appearance. She is normal weight.   HENT:      Head: Normocephalic and atraumatic.      Mouth/Throat:      Pharynx: No posterior oropharyngeal erythema.   Eyes:      General: No scleral icterus.     Conjunctiva/sclera: Conjunctivae normal.      Pupils: Pupils are equal, round, and reactive to light.   Cardiovascular:      Rate and Rhythm: Normal rate and regular rhythm.      Heart sounds: Normal heart sounds.   Pulmonary:      Effort: No respiratory distress.      Breath sounds: No wheezing.   Abdominal:      General: Abdomen is flat. Bowel sounds " are normal. There is no distension.      Palpations: Abdomen is soft. There is no mass.      Tenderness: There is no abdominal tenderness. There is no rebound.   Musculoskeletal:         General: Normal range of motion.      Cervical back: Normal range of motion and neck supple.   Skin:     General: Skin is warm and dry.   Neurological:      General: No focal deficit present.      Mental Status: She is alert and oriented to person, place, and time. Mental status is at baseline.   Psychiatric:         Mood and Affect: Mood normal.         Behavior: Behavior normal.         Thought Content: Thought content normal.         Judgment: Judgment normal.         Assessment/Plan   Problem List Items Addressed This Visit       RA (rheumatoid arthritis) (CMS/[facility])    Relevant Orders    CBC and Auto Differential    Vitamin D deficiency    Relevant Orders    Vitamin D 25-Hydroxy,Total (for eval of Vitamin D levels)     Other Visit Diagnoses       Encounter for subsequent annual wellness visit (AWV) in Medicare patient    -  Primary    Hypertension, unspecified type        Relevant Orders    ECG 12 Lead (Completed)    Hypercholesteremia        Relevant Medications    rosuvastatin (Crestor) 40 mg tablet    Other Relevant Orders    Comprehensive Metabolic Panel    Lipid Panel    TSH with reflex to Free T4 if abnormal    Encounter for screening mammogram for malignant neoplasm of breast        Relevant Orders    BI mammo bilateral screening tomosynthesis    Anxiety        Relevant Medications    sertraline (Zoloft) 50 mg tablet    Dense breast tissue on mammogram, unspecified type        Relevant Orders    MR breast bilateral w IV contrast fast screening self pay    BMI 27.0-27.9,adult              Medicare wellness    mammogram 4-23  Dexa 4-23  Colonoscopy at 56 yrs  due at 66 yrs  CT chest lung cancer screening n/a  GYN n/a  immunizations rev'd  BMI 27.7    Follow up    Hypertension on diet  Home readings rev'd and acceptable  Gets  anxious easily  Continue zoloft feels better  no side effects  EKG SR 69     face rosacea   sees derm     GERD stable on rx no side effects     RA stable on rx no side effects  osteoporosis  sees rheum     cholesterol stable on rx no side effects     vit D stable on rx no side effects     Dense breast tissue on mammogram rev'd  Fast MRI ordered     diet / exercise rev'd    Check labs, mammogram    Follow up 6 months

## 2024-04-08 ENCOUNTER — APPOINTMENT (OUTPATIENT)
Dept: RADIOLOGY | Facility: HOSPITAL | Age: 64
End: 2024-04-08
Payer: MEDICARE

## 2024-04-08 ENCOUNTER — HOSPITAL ENCOUNTER (OUTPATIENT)
Dept: RADIOLOGY | Facility: HOSPITAL | Age: 64
Discharge: HOME | End: 2024-04-08
Payer: MEDICARE

## 2024-04-08 VITALS — HEIGHT: 63 IN | BODY MASS INDEX: 26.58 KG/M2 | WEIGHT: 150 LBS

## 2024-04-08 DIAGNOSIS — Z12.31 ENCOUNTER FOR SCREENING MAMMOGRAM FOR MALIGNANT NEOPLASM OF BREAST: ICD-10-CM

## 2024-04-08 PROCEDURE — 77067 SCR MAMMO BI INCL CAD: CPT

## 2024-04-08 PROCEDURE — 77067 SCR MAMMO BI INCL CAD: CPT | Performed by: STUDENT IN AN ORGANIZED HEALTH CARE EDUCATION/TRAINING PROGRAM

## 2024-04-08 PROCEDURE — 77063 BREAST TOMOSYNTHESIS BI: CPT | Performed by: STUDENT IN AN ORGANIZED HEALTH CARE EDUCATION/TRAINING PROGRAM

## 2024-05-13 ENCOUNTER — OFFICE VISIT (OUTPATIENT)
Dept: RHEUMATOLOGY | Facility: CLINIC | Age: 64
End: 2024-05-13
Payer: MEDICARE

## 2024-05-13 VITALS
HEART RATE: 66 BPM | SYSTOLIC BLOOD PRESSURE: 173 MMHG | DIASTOLIC BLOOD PRESSURE: 78 MMHG | OXYGEN SATURATION: 96 % | WEIGHT: 155 LBS | BODY MASS INDEX: 27.46 KG/M2 | HEIGHT: 63 IN

## 2024-05-13 DIAGNOSIS — M05.9 SEROPOSITIVE RHEUMATOID ARTHRITIS (MULTI): Primary | ICD-10-CM

## 2024-05-13 DIAGNOSIS — Z79.899 LONG-TERM USE OF PLAQUENIL: ICD-10-CM

## 2024-05-13 PROCEDURE — 99213 OFFICE O/P EST LOW 20 MIN: CPT | Performed by: INTERNAL MEDICINE

## 2024-05-13 PROCEDURE — 1125F AMNT PAIN NOTED PAIN PRSNT: CPT | Performed by: INTERNAL MEDICINE

## 2024-05-13 PROCEDURE — 1159F MED LIST DOCD IN RCRD: CPT | Performed by: INTERNAL MEDICINE

## 2024-05-13 PROCEDURE — 1160F RVW MEDS BY RX/DR IN RCRD: CPT | Performed by: INTERNAL MEDICINE

## 2024-05-13 PROCEDURE — 3008F BODY MASS INDEX DOCD: CPT | Performed by: INTERNAL MEDICINE

## 2024-05-13 RX ORDER — HYDROXYCHLOROQUINE SULFATE 200 MG/1
TABLET, FILM COATED ORAL
Qty: 135 TABLET | Refills: 3 | Status: SHIPPED | OUTPATIENT
Start: 2024-05-13

## 2024-05-13 ASSESSMENT — ENCOUNTER SYMPTOMS
ABDOMINAL PAIN: 0
FATIGUE: 0
SHORTNESS OF BREATH: 0

## 2024-05-13 ASSESSMENT — PAIN SCALES - GENERAL: PAINLEVEL: 6

## 2024-05-13 NOTE — PROGRESS NOTES
Subjective   Patient ID: Zeynep Winters is a 64 y.o. female who presents for Follow-up.  HPI  Patient with history of seropositive rheumatoid arthritis.    She was last seen May 2023.  At that time we had her continue on Plaquenil at 200 mg daily.  Has been at a higher dose previously.  She has felt that she has been more bothered in her hands and feet.  Has been aching for her.  She has had a lot of problems walking.  She got a new puppy last summer and has difficulty later on.  Has been doing a lot of knitting and is only able to do this.    Nothing else new with her health.    She does get her eyes checked twice a year because she has had some increase in pressure.    Review of Systems   Constitutional:  Negative for fatigue.   Eyes:         Gets her eyes checked twice a year   Respiratory:  Negative for shortness of breath.    Cardiovascular:  Negative for chest pain.   Gastrointestinal:  Negative for abdominal pain.   Musculoskeletal:         Per HPI   Psychiatric/Behavioral:          Sleeping better       Objective   Physical Exam  GEN: NAD A&O x3 appropriate affect  HEENT: no enlarged glands or thyroid  LYMPH: no cervical lymphadenopathy  SKIN: no rashes  PULSES: +radials  TENDER POINTS: 0/18   MSK:   Mild fullness in the second third MCP compared to the left no other swelling of the wrist elbows shoulders hypertrophic change in the first MTP on the right but no swelling in the MTPs of bilateral feet  CMC hypertrophy more on the right than the left  Assessment/Plan     Seropositive rheumatoid arthritis   -Has had some increase in symptoms and some mild fullness in the MCPs.  Will increase her dose to 200 mg 1 day and 400 mg the next.  For her weight next dose would be 350 mg.  Will have her do blood work.    Will reevaluate her again in 3 to 4 months.    Maribel Flores MD 05/13/24 10:17 AM

## 2024-05-17 ENCOUNTER — LAB (OUTPATIENT)
Dept: LAB | Facility: LAB | Age: 64
End: 2024-05-17
Payer: MEDICARE

## 2024-05-17 DIAGNOSIS — M06.9 RHEUMATOID ARTHRITIS INVOLVING MULTIPLE SITES, UNSPECIFIED WHETHER RHEUMATOID FACTOR PRESENT (MULTI): ICD-10-CM

## 2024-05-17 DIAGNOSIS — E78.00 HYPERCHOLESTEREMIA: ICD-10-CM

## 2024-05-17 DIAGNOSIS — E55.9 VITAMIN D DEFICIENCY: ICD-10-CM

## 2024-05-17 DIAGNOSIS — M05.9 SEROPOSITIVE RHEUMATOID ARTHRITIS (MULTI): ICD-10-CM

## 2024-05-17 LAB
25(OH)D3 SERPL-MCNC: 59 NG/ML (ref 30–100)
ALBUMIN SERPL BCP-MCNC: 4.6 G/DL (ref 3.4–5)
ALP SERPL-CCNC: 62 U/L (ref 33–136)
ALT SERPL W P-5'-P-CCNC: 24 U/L (ref 7–45)
ANION GAP SERPL CALC-SCNC: 12 MMOL/L (ref 10–20)
AST SERPL W P-5'-P-CCNC: 22 U/L (ref 9–39)
BASOPHILS # BLD AUTO: 0.02 X10*3/UL (ref 0–0.1)
BASOPHILS NFR BLD AUTO: 0.5 %
BILIRUB SERPL-MCNC: 0.4 MG/DL (ref 0–1.2)
BUN SERPL-MCNC: 13 MG/DL (ref 6–23)
CALCIUM SERPL-MCNC: 9.6 MG/DL (ref 8.6–10.3)
CHLORIDE SERPL-SCNC: 105 MMOL/L (ref 98–107)
CHOLEST SERPL-MCNC: 177 MG/DL (ref 0–199)
CHOLESTEROL/HDL RATIO: 3.7
CO2 SERPL-SCNC: 30 MMOL/L (ref 21–32)
CREAT SERPL-MCNC: 0.85 MG/DL (ref 0.5–1.05)
CRP SERPL-MCNC: <0.1 MG/DL
EGFRCR SERPLBLD CKD-EPI 2021: 76 ML/MIN/1.73M*2
EOSINOPHIL # BLD AUTO: 0.1 X10*3/UL (ref 0–0.7)
EOSINOPHIL NFR BLD AUTO: 2.5 %
ERYTHROCYTE [DISTWIDTH] IN BLOOD BY AUTOMATED COUNT: 12.9 % (ref 11.5–14.5)
ERYTHROCYTE [SEDIMENTATION RATE] IN BLOOD BY WESTERGREN METHOD: 3 MM/H (ref 0–30)
GLUCOSE SERPL-MCNC: 100 MG/DL (ref 74–99)
HCT VFR BLD AUTO: 46 % (ref 36–46)
HDLC SERPL-MCNC: 47.8 MG/DL
HGB BLD-MCNC: 15.1 G/DL (ref 12–16)
IMM GRANULOCYTES # BLD AUTO: 0.02 X10*3/UL (ref 0–0.7)
IMM GRANULOCYTES NFR BLD AUTO: 0.5 % (ref 0–0.9)
LDLC SERPL CALC-MCNC: 103 MG/DL
LYMPHOCYTES # BLD AUTO: 1.42 X10*3/UL (ref 1.2–4.8)
LYMPHOCYTES NFR BLD AUTO: 35.2 %
MCH RBC QN AUTO: 29.4 PG (ref 26–34)
MCHC RBC AUTO-ENTMCNC: 32.8 G/DL (ref 32–36)
MCV RBC AUTO: 90 FL (ref 80–100)
MONOCYTES # BLD AUTO: 0.23 X10*3/UL (ref 0.1–1)
MONOCYTES NFR BLD AUTO: 5.7 %
NEUTROPHILS # BLD AUTO: 2.24 X10*3/UL (ref 1.2–7.7)
NEUTROPHILS NFR BLD AUTO: 55.6 %
NON HDL CHOLESTEROL: 129 MG/DL (ref 0–149)
NRBC BLD-RTO: 0 /100 WBCS (ref 0–0)
PLATELET # BLD AUTO: 166 X10*3/UL (ref 150–450)
POTASSIUM SERPL-SCNC: 3.8 MMOL/L (ref 3.5–5.3)
PROT SERPL-MCNC: 7.4 G/DL (ref 6.4–8.2)
RBC # BLD AUTO: 5.13 X10*6/UL (ref 4–5.2)
SODIUM SERPL-SCNC: 143 MMOL/L (ref 136–145)
TRIGL SERPL-MCNC: 132 MG/DL (ref 0–149)
TSH SERPL-ACNC: 1.27 MIU/L (ref 0.44–3.98)
VLDL: 26 MG/DL (ref 0–40)
WBC # BLD AUTO: 4 X10*3/UL (ref 4.4–11.3)

## 2024-05-17 PROCEDURE — 82306 VITAMIN D 25 HYDROXY: CPT

## 2024-05-17 PROCEDURE — 36415 COLL VENOUS BLD VENIPUNCTURE: CPT

## 2024-08-07 ENCOUNTER — APPOINTMENT (OUTPATIENT)
Dept: PRIMARY CARE | Facility: CLINIC | Age: 64
End: 2024-08-07
Payer: MEDICARE

## 2024-08-07 VITALS
TEMPERATURE: 98 F | WEIGHT: 156.6 LBS | SYSTOLIC BLOOD PRESSURE: 107 MMHG | DIASTOLIC BLOOD PRESSURE: 66 MMHG | OXYGEN SATURATION: 95 % | BODY MASS INDEX: 27.74 KG/M2 | HEART RATE: 85 BPM

## 2024-08-07 DIAGNOSIS — Z71.2 ENCOUNTER TO DISCUSS TEST RESULTS: Primary | ICD-10-CM

## 2024-08-07 DIAGNOSIS — E78.00 HYPERCHOLESTEREMIA: ICD-10-CM

## 2024-08-07 DIAGNOSIS — I10 HYPERTENSION, UNSPECIFIED TYPE: ICD-10-CM

## 2024-08-07 DIAGNOSIS — K21.9 GASTROESOPHAGEAL REFLUX DISEASE, UNSPECIFIED WHETHER ESOPHAGITIS PRESENT: ICD-10-CM

## 2024-08-07 DIAGNOSIS — F41.9 ANXIETY: ICD-10-CM

## 2024-08-07 DIAGNOSIS — E55.9 VITAMIN D DEFICIENCY: ICD-10-CM

## 2024-08-07 PROCEDURE — 1159F MED LIST DOCD IN RCRD: CPT | Performed by: INTERNAL MEDICINE

## 2024-08-07 PROCEDURE — 99214 OFFICE O/P EST MOD 30 MIN: CPT | Performed by: INTERNAL MEDICINE

## 2024-08-07 PROCEDURE — 3074F SYST BP LT 130 MM HG: CPT | Performed by: INTERNAL MEDICINE

## 2024-08-07 PROCEDURE — 3078F DIAST BP <80 MM HG: CPT | Performed by: INTERNAL MEDICINE

## 2024-08-07 PROCEDURE — 1036F TOBACCO NON-USER: CPT | Performed by: INTERNAL MEDICINE

## 2024-08-07 PROCEDURE — 1160F RVW MEDS BY RX/DR IN RCRD: CPT | Performed by: INTERNAL MEDICINE

## 2024-08-07 PROCEDURE — G0446 INTENS BEHAVE THER CARDIO DX: HCPCS | Performed by: INTERNAL MEDICINE

## 2024-08-07 RX ORDER — SERTRALINE HYDROCHLORIDE 50 MG/1
TABLET, FILM COATED ORAL
Qty: 90 TABLET | Refills: 1 | Status: SHIPPED | OUTPATIENT
Start: 2024-08-07

## 2024-08-07 RX ORDER — ROSUVASTATIN CALCIUM 40 MG/1
40 TABLET, COATED ORAL DAILY
Qty: 90 TABLET | Refills: 1 | Status: SHIPPED | OUTPATIENT
Start: 2024-08-07

## 2024-08-07 ASSESSMENT — ENCOUNTER SYMPTOMS: TREMORS: 1

## 2024-08-15 ENCOUNTER — APPOINTMENT (OUTPATIENT)
Dept: RHEUMATOLOGY | Facility: CLINIC | Age: 64
End: 2024-08-15
Payer: MEDICARE

## 2024-08-15 VITALS
BODY MASS INDEX: 27.46 KG/M2 | SYSTOLIC BLOOD PRESSURE: 134 MMHG | DIASTOLIC BLOOD PRESSURE: 78 MMHG | HEART RATE: 73 BPM | WEIGHT: 155 LBS | OXYGEN SATURATION: 95 % | HEIGHT: 63 IN

## 2024-08-15 DIAGNOSIS — M05.9 SEROPOSITIVE RHEUMATOID ARTHRITIS (MULTI): Primary | ICD-10-CM

## 2024-08-15 DIAGNOSIS — Z79.899 ENCOUNTER FOR LONG-TERM (CURRENT) USE OF MEDICATIONS: ICD-10-CM

## 2024-08-15 PROCEDURE — 99214 OFFICE O/P EST MOD 30 MIN: CPT | Performed by: INTERNAL MEDICINE

## 2024-08-15 PROCEDURE — 3008F BODY MASS INDEX DOCD: CPT | Performed by: INTERNAL MEDICINE

## 2024-08-15 PROCEDURE — 1125F AMNT PAIN NOTED PAIN PRSNT: CPT | Performed by: INTERNAL MEDICINE

## 2024-08-15 PROCEDURE — 1036F TOBACCO NON-USER: CPT | Performed by: INTERNAL MEDICINE

## 2024-08-15 RX ORDER — METHOTREXATE 2.5 MG/1
10 TABLET ORAL
Qty: 16 TABLET | Refills: 3 | Status: SHIPPED | OUTPATIENT
Start: 2024-08-18 | End: 2024-12-16

## 2024-08-15 RX ORDER — FOLIC ACID 1 MG/1
1 TABLET ORAL DAILY
Qty: 30 TABLET | Refills: 11 | Status: SHIPPED | OUTPATIENT
Start: 2024-08-15 | End: 2025-08-15

## 2024-08-15 ASSESSMENT — ENCOUNTER SYMPTOMS
DEPRESSION: 0
SHORTNESS OF BREATH: 0
OCCASIONAL FEELINGS OF UNSTEADINESS: 0
ABDOMINAL PAIN: 0
FATIGUE: 0
LOSS OF SENSATION IN FEET: 0

## 2024-08-15 ASSESSMENT — PATIENT HEALTH QUESTIONNAIRE - PHQ9
1. LITTLE INTEREST OR PLEASURE IN DOING THINGS: NOT AT ALL
SUM OF ALL RESPONSES TO PHQ9 QUESTIONS 1 AND 2: 0
2. FEELING DOWN, DEPRESSED OR HOPELESS: NOT AT ALL

## 2024-08-15 ASSESSMENT — PAIN SCALES - GENERAL: PAINLEVEL: 3

## 2024-08-15 NOTE — PROGRESS NOTES
Subjective   Patient ID: Zeynep Winters is a 64 y.o. female who presents for Follow-up.  HPI  Patient with history of seropositive rheumatoid arthritis.    At her last visit in May we had increased her hydroxychloroquine.  She still has pain and stiffness especially in her hands and also in her feet that makes it difficult to walk.  She always has to wear shoes.  She has not gone back to [employer] and has been wanting to do a project for her granddaughter for [employer].    Review of Systems   Constitutional:  Negative for fatigue.   Eyes:         Gets her eyes checked twice a year   Respiratory:  Negative for shortness of breath.    Cardiovascular:  Negative for chest pain.   Gastrointestinal:  Negative for abdominal pain.   Musculoskeletal:         Per HPI   Psychiatric/Behavioral:          Sleeping better       Objective   Physical Exam  GEN: NAD A&O x3 appropriate affect  HEENT: no enlarged glands or thyroid  LYMPH: no cervical lymphadenopathy  SKIN: no rashes  PULSES: +radials  TENDER POINTS: 0/18   MSK:   Still with fullness in the MCPs 2 and 3 more on the right than the left also mild swelling in the left wrist hypertrophic changes in the MCPs but no current swelling   CMC hypertrophy more on the right than the left  Mild fullness in the first MCPs bilaterally  Assessment/Plan     Seropositive rheumatoid arthritis   -Has had some increase in symptoms in her hands even with the increase in hydroxychloroquine has not significantly helped.  Will add methotrexate 10 mg once a week and folic acid 1 mg daily.  Discussed side effects of the medication.  She is going to get a root canal next week and I told her to hold off starting until she is done with this.  Will have her do blood work before her next visit    Will reevaluate her again in 3 to 4 months.    Maribel Flores MD 08/15/24 11:46 AM

## 2024-10-08 ENCOUNTER — OFFICE VISIT (OUTPATIENT)
Dept: PRIMARY CARE | Facility: CLINIC | Age: 64
End: 2024-10-08
Payer: MEDICARE

## 2024-10-08 VITALS
OXYGEN SATURATION: 98 % | SYSTOLIC BLOOD PRESSURE: 138 MMHG | HEART RATE: 78 BPM | WEIGHT: 159.36 LBS | BODY MASS INDEX: 28.23 KG/M2 | DIASTOLIC BLOOD PRESSURE: 92 MMHG | TEMPERATURE: 96.5 F

## 2024-10-08 DIAGNOSIS — T75.3XXA SEA SICKNESS, INITIAL ENCOUNTER: ICD-10-CM

## 2024-10-08 DIAGNOSIS — I10 HYPERTENSION, UNSPECIFIED TYPE: ICD-10-CM

## 2024-10-08 DIAGNOSIS — R25.1 INTERMITTENT TREMOR: Primary | ICD-10-CM

## 2024-10-08 DIAGNOSIS — M06.9 RHEUMATOID ARTHRITIS INVOLVING MULTIPLE SITES, UNSPECIFIED WHETHER RHEUMATOID FACTOR PRESENT (MULTI): ICD-10-CM

## 2024-10-08 PROCEDURE — 1159F MED LIST DOCD IN RCRD: CPT | Performed by: INTERNAL MEDICINE

## 2024-10-08 PROCEDURE — 3075F SYST BP GE 130 - 139MM HG: CPT | Performed by: INTERNAL MEDICINE

## 2024-10-08 PROCEDURE — 1036F TOBACCO NON-USER: CPT | Performed by: INTERNAL MEDICINE

## 2024-10-08 PROCEDURE — 3080F DIAST BP >= 90 MM HG: CPT | Performed by: INTERNAL MEDICINE

## 2024-10-08 PROCEDURE — 99214 OFFICE O/P EST MOD 30 MIN: CPT | Performed by: INTERNAL MEDICINE

## 2024-10-08 PROCEDURE — 1160F RVW MEDS BY RX/DR IN RCRD: CPT | Performed by: INTERNAL MEDICINE

## 2024-10-08 RX ORDER — SCOLOPAMINE TRANSDERMAL SYSTEM 1 MG/1
1 PATCH, EXTENDED RELEASE TRANSDERMAL
Qty: 10 PATCH | Refills: 0 | Status: SHIPPED | OUTPATIENT
Start: 2024-10-08 | End: 2024-12-07

## 2024-10-08 ASSESSMENT — ENCOUNTER SYMPTOMS: TREMORS: 1

## 2024-10-08 NOTE — PROGRESS NOTES
Subjective   Patient ID: Zeynep Winters is a 64 y.o. female who presents for Tremors (Still having tremors in the morning //Happens when patients stretches ).  Tremor          Same day appt        Shakes upon waking up only      Feels like dehydrated or low blood sugar      Fine rest of the day      Poor sleep quality due to spouse not sleeping well      Worse with stretching      Increase fluids  since sodium 143  5-24      Neuro consult      No family h/o parkinson's      Follow        Cruise next week      Scopolamine patches for motion sickness as directed     Hypertension on diet  Home readings rev'd and acceptable  Gets anxious easily  Continue zoloft feels better  no side effects  EKG SR 69 2-24     face rosacea   sees derm     GERD stable on rx no side effects     RA stable on rx no side effects  osteoporosis  sees rheum     cholesterol stable on rx no side effects     vit D stable on rx no side effects     Dense breast tissue on mammogram rev'd  Fast MRI ordered     diet / exercise rev'd    Review of Systems   Neurological:  Positive for tremors.   All other systems reviewed and are negative.      Objective   BP (!) 138/92   Pulse 78   Temp 35.8 °C (96.5 °F)   Wt 72.3 kg (159 lb 5.8 oz)   SpO2 98%   BMI 28.23 kg/m²   Lab Results   Component Value Date    WBC 4.0 (L) 05/17/2024    HGB 15.1 05/17/2024    HCT 46.0 05/17/2024     05/17/2024    CHOL 177 05/17/2024    TRIG 132 05/17/2024    HDL 47.8 05/17/2024    ALT 24 05/17/2024    AST 22 05/17/2024     05/17/2024    K 3.8 05/17/2024     05/17/2024    CREATININE 0.85 05/17/2024    BUN 13 05/17/2024    CO2 30 05/17/2024    TSH 1.27 05/17/2024    HGBA1C 5.4 07/17/2023           Physical Exam  Vitals reviewed.   Constitutional:       Appearance: Normal appearance. She is normal weight.   HENT:      Head: Normocephalic and atraumatic.      Mouth/Throat:      Pharynx: No posterior oropharyngeal erythema.   Eyes:      General: No scleral  icterus.     Conjunctiva/sclera: Conjunctivae normal.      Pupils: Pupils are equal, round, and reactive to light.   Cardiovascular:      Rate and Rhythm: Normal rate and regular rhythm.      Heart sounds: Normal heart sounds.   Pulmonary:      Effort: No respiratory distress.      Breath sounds: No wheezing.   Abdominal:      General: Abdomen is flat. Bowel sounds are normal. There is no distension.      Palpations: Abdomen is soft. There is no mass.      Tenderness: There is no abdominal tenderness. There is no rebound.   Musculoskeletal:         General: Normal range of motion.      Cervical back: Normal range of motion and neck supple.   Skin:     General: Skin is warm and dry.   Neurological:      General: No focal deficit present.      Mental Status: She is alert and oriented to person, place, and time. Mental status is at baseline.   Psychiatric:         Mood and Affect: Mood normal.         Behavior: Behavior normal.         Thought Content: Thought content normal.         Judgment: Judgment normal.         Problem List Items Addressed This Visit             ICD-10-CM    RA (rheumatoid arthritis) M06.9     Other Visit Diagnoses         Codes    Intermittent tremor    -  Primary R25.1    Relevant Orders    Referral to Neurology    Hypertension, unspecified type     I10    Sea sickness, initial encounter     T75.3XXA    Relevant Medications    scopolamine (Transderm-Scop) 1 mg over 3 days patch 3 day    BMI 28.0-28.9,adult     Z68.28          Assessment/Plan        Same day appt        Shakes upon waking up only      Feels like dehydrated or low blood sugar      Fine rest of the day      Poor sleep quality due to spouse not sleeping well      Worse with stretching      Increase fluids  since sodium 143  5-24      Neuro consult      No family h/o parkinson's      Follow        Cruise next week      Scopolamine patches for motion sickness as directed     Hypertension on diet  Home readings rev'd and  acceptable  Gets anxious easily  Continue zoloft feels better  no side effects  EKG SR 69 2-24     face rosacea   sees derm     GERD stable on rx no side effects     RA stable on rx no side effects  osteoporosis  sees rheum     cholesterol stable on rx no side effects     vit D stable on rx no side effects     Dense breast tissue on mammogram rev'd  Fast MRI ordered     diet / exercise rev'd    mammogram 4-24  Dexa 4-23  Colonoscopy at 56 yrs  due at 66 yrs  CT chest lung cancer screening n/a  GYN n/a  immunizations rev'd  BMI 28.2    Follow up as scheduled

## 2024-11-15 ENCOUNTER — LAB (OUTPATIENT)
Dept: LAB | Facility: LAB | Age: 64
End: 2024-11-15
Payer: MEDICARE

## 2024-11-15 DIAGNOSIS — M05.9 SEROPOSITIVE RHEUMATOID ARTHRITIS: ICD-10-CM

## 2024-11-15 LAB
ALBUMIN SERPL BCP-MCNC: 4.8 G/DL (ref 3.4–5)
ALP SERPL-CCNC: 57 U/L (ref 33–136)
ALT SERPL W P-5'-P-CCNC: 26 U/L (ref 7–45)
ANION GAP SERPL CALC-SCNC: 11 MMOL/L (ref 10–20)
AST SERPL W P-5'-P-CCNC: 21 U/L (ref 9–39)
BILIRUB SERPL-MCNC: 0.5 MG/DL (ref 0–1.2)
BUN SERPL-MCNC: 11 MG/DL (ref 6–23)
CALCIUM SERPL-MCNC: 10 MG/DL (ref 8.6–10.3)
CHLORIDE SERPL-SCNC: 105 MMOL/L (ref 98–107)
CO2 SERPL-SCNC: 30 MMOL/L (ref 21–32)
CREAT SERPL-MCNC: 0.83 MG/DL (ref 0.5–1.05)
CRP SERPL-MCNC: <0.1 MG/DL
EGFRCR SERPLBLD CKD-EPI 2021: 78 ML/MIN/1.73M*2
ERYTHROCYTE [DISTWIDTH] IN BLOOD BY AUTOMATED COUNT: 13.8 % (ref 11.5–14.5)
ERYTHROCYTE [SEDIMENTATION RATE] IN BLOOD BY WESTERGREN METHOD: 1 MM/H (ref 0–30)
GLUCOSE SERPL-MCNC: 89 MG/DL (ref 74–99)
HCT VFR BLD AUTO: 47.1 % (ref 36–46)
HGB BLD-MCNC: 15.5 G/DL (ref 12–16)
MCH RBC QN AUTO: 30.3 PG (ref 26–34)
MCHC RBC AUTO-ENTMCNC: 32.9 G/DL (ref 32–36)
MCV RBC AUTO: 92 FL (ref 80–100)
NRBC BLD-RTO: 0 /100 WBCS (ref 0–0)
PLATELET # BLD AUTO: 197 X10*3/UL (ref 150–450)
POTASSIUM SERPL-SCNC: 4.3 MMOL/L (ref 3.5–5.3)
PROT SERPL-MCNC: 7.3 G/DL (ref 6.4–8.2)
RBC # BLD AUTO: 5.12 X10*6/UL (ref 4–5.2)
SODIUM SERPL-SCNC: 142 MMOL/L (ref 136–145)
WBC # BLD AUTO: 4.4 X10*3/UL (ref 4.4–11.3)

## 2024-11-15 PROCEDURE — 36415 COLL VENOUS BLD VENIPUNCTURE: CPT

## 2024-11-21 ENCOUNTER — APPOINTMENT (OUTPATIENT)
Dept: RHEUMATOLOGY | Facility: CLINIC | Age: 64
End: 2024-11-21
Payer: MEDICARE

## 2024-11-21 VITALS
BODY MASS INDEX: 27.71 KG/M2 | DIASTOLIC BLOOD PRESSURE: 85 MMHG | WEIGHT: 156.4 LBS | OXYGEN SATURATION: 95 % | SYSTOLIC BLOOD PRESSURE: 169 MMHG | HEART RATE: 85 BPM | HEIGHT: 63 IN

## 2024-11-21 DIAGNOSIS — Z79.899 ENCOUNTER FOR LONG-TERM (CURRENT) USE OF MEDICATIONS: ICD-10-CM

## 2024-11-21 DIAGNOSIS — M05.9 SEROPOSITIVE RHEUMATOID ARTHRITIS: Primary | ICD-10-CM

## 2024-11-21 PROCEDURE — 3008F BODY MASS INDEX DOCD: CPT | Performed by: INTERNAL MEDICINE

## 2024-11-21 PROCEDURE — 1159F MED LIST DOCD IN RCRD: CPT | Performed by: INTERNAL MEDICINE

## 2024-11-21 PROCEDURE — 99213 OFFICE O/P EST LOW 20 MIN: CPT | Performed by: INTERNAL MEDICINE

## 2024-11-21 PROCEDURE — G2211 COMPLEX E/M VISIT ADD ON: HCPCS | Performed by: INTERNAL MEDICINE

## 2024-11-21 ASSESSMENT — ENCOUNTER SYMPTOMS
SHORTNESS OF BREATH: 0
FATIGUE: 0
ABDOMINAL PAIN: 0

## 2024-11-21 NOTE — PROGRESS NOTES
Subjective   Patient ID: Zeynep Winters is a 64 y.o. female who presents for Follow-up (3-4 mo fuv).  HPI  Patient with history of seropositive rheumatoid arthritis.    Patient was last seen in August and at that time we added methotrexate and folic acid. She says that she is knitting again.  Her hands are usually the worst and has symptoms in the fourth PIP on the right and had to take her ring off because it still sore.  She usually takes her methotrexate on Sunday and and Sunday night her stomach is upset.  In the morning she has a little bit of tremor  Will be seeing neurology  Review of Systems   Constitutional:  Negative for fatigue.   Eyes:         Gets her eyes checked twice a year   Respiratory:  Negative for shortness of breath.    Cardiovascular:  Negative for chest pain.   Gastrointestinal:  Negative for abdominal pain.   Musculoskeletal:         Per HPI   Neurological:  Positive for tremors.   Psychiatric/Behavioral:          Sleeping better       Objective   Physical Exam  GEN: NAD A&O x3 appropriate affect  HEENT: no enlarged glands or thyroid  LYMPH: no cervical lymphadenopathy  SKIN: no rashes  PULSES: +radials  TENDER POINTS: 0/18   MSK:   Decrease in swelling MCPs 2 and 3 and also fourth PIP right also hypertrophic changes.  CMC hypertrophyAssessment/Plan     Seropositive rheumatoid arthritis   -Has had improvement with methotrexate and we will have her continue with this and hydroxychloroquine.  Reviewed her recent blood work and has normal inflammatory markers and CMP.    Will reevaluate her again in 3 to 4 months.    Maribel Flores MD 11/21/24 10:05 AM

## 2024-12-10 ENCOUNTER — APPOINTMENT (OUTPATIENT)
Dept: NEUROLOGY | Facility: CLINIC | Age: 64
End: 2024-12-10
Payer: MEDICARE

## 2024-12-10 VITALS
SYSTOLIC BLOOD PRESSURE: 159 MMHG | RESPIRATION RATE: 14 BRPM | BODY MASS INDEX: 27.85 KG/M2 | DIASTOLIC BLOOD PRESSURE: 79 MMHG | HEIGHT: 63 IN | WEIGHT: 157.2 LBS | TEMPERATURE: 95.4 F | HEART RATE: 87 BPM

## 2024-12-10 DIAGNOSIS — R25.1 INTERMITTENT TREMOR: ICD-10-CM

## 2024-12-10 PROCEDURE — 1036F TOBACCO NON-USER: CPT | Performed by: PSYCHIATRY & NEUROLOGY

## 2024-12-10 PROCEDURE — 1126F AMNT PAIN NOTED NONE PRSNT: CPT | Performed by: PSYCHIATRY & NEUROLOGY

## 2024-12-10 PROCEDURE — 3008F BODY MASS INDEX DOCD: CPT | Performed by: PSYCHIATRY & NEUROLOGY

## 2024-12-10 PROCEDURE — 99204 OFFICE O/P NEW MOD 45 MIN: CPT | Performed by: PSYCHIATRY & NEUROLOGY

## 2024-12-10 PROCEDURE — 1160F RVW MEDS BY RX/DR IN RCRD: CPT | Performed by: PSYCHIATRY & NEUROLOGY

## 2024-12-10 PROCEDURE — 1159F MED LIST DOCD IN RCRD: CPT | Performed by: PSYCHIATRY & NEUROLOGY

## 2024-12-10 RX ORDER — VIT C/E/ZN/COPPR/LUTEIN/ZEAXAN 250MG-90MG
400 CAPSULE ORAL DAILY
COMMUNITY

## 2024-12-10 ASSESSMENT — ENCOUNTER SYMPTOMS
LOSS OF SENSATION IN FEET: 0
OCCASIONAL FEELINGS OF UNSTEADINESS: 0
DEPRESSION: 0

## 2024-12-10 ASSESSMENT — PAIN SCALES - GENERAL: PAINLEVEL_OUTOF10: 0-NO PAIN

## 2024-12-10 NOTE — PROGRESS NOTES
Consultation:   Subjective      Thortrisha Winters is a 64 y.o. year old female is here for consult for tremors.   Referred by Kathia Harvey MD  Arrives with  who also provides history.     HPI  Tremors began in 2024, intermittent only when waking up.   Still drowsy state and she will stretch and she has tremors when outstretched her arms when she wakes up which may last less than 1 minute.    Never occurs other times in the day.   No trouble with writing.   No cramps.   1 cup coffee daily.  No alcohol.   Family history: father  at 62 of heart attack. Mother still alive, arthritis.  2 sisters have glaucoma.   Sister and daughter have anxiety.   No head injuries.  When out weeding in the garden and getting up she may get woozy.    She fainted in late 30s.   TSH WNL.  Sleep is good.   No walking issues.  No headaches.      Review of Systems    Patient Active Problem List   Diagnosis    Abnormal mammogram of left breast    Acute bronchitis    Acute pharyngitis    Acute sinusitis    Allergic rhinitis    Dermatitis    GERD (gastroesophageal reflux disease)    Hypercholesterolemia    Hyperglycemia    Influenza B    Osteoporosis    RA (rheumatoid arthritis)    Sore throat    UTI symptoms    Vitamin D deficiency     Past Medical History:   Diagnosis Date    Encounter for full-term uncomplicated delivery      (spontaneous vaginal delivery)    Encounter for screening for malignant neoplasm of colon     Encounter for screening colonoscopy    Gastro-esophageal reflux disease without esophagitis 2017    GERD without esophagitis    Impacted cerumen, right ear 2015    Impacted cerumen of right ear    Other allergy status, other than to drugs and biological substances 2015    Environmental allergies    Other specified disorders of Eustachian tube, unspecified ear 2015    Eustachian tube dysfunction    Personal history of other diseases of the digestive system 2016    History of  constipation    Personal history of other diseases of the musculoskeletal system and connective tissue 06/03/2019    History of osteoporosis    Personal history of other infectious and parasitic diseases     History of chicken pox    Polyp of colon 01/18/2017    Hyperplastic polyp of transverse colon    Rheumatoid arthritis with rheumatoid factor, unspecified 05/08/2017    Rheumatoid arthritis, seropositive    Unspecified osteoarthritis, unspecified site 02/17/2015    Inflammatory arthritis    Varicella without complication     Varicella without complication     Past Surgical History:   Procedure Laterality Date    BREAST CYST EXCISION Bilateral     COLONOSCOPY  12/28/2016    Complete Colonoscopy    COLONOSCOPY  12/28/2016    Complete Colonoscopy    DILATION AND CURETTAGE OF UTERUS  03/09/2015    Dilation And Curettage    OTHER SURGICAL HISTORY  03/09/2015    Breast Surgery Puncture Aspiration Of Cyst Right Breast    OTHER SURGICAL HISTORY  07/14/2014    History Of Prior Surgery     Social History     Tobacco Use    Smoking status: Never     Passive exposure: Never    Smokeless tobacco: Never   Substance Use Topics    Alcohol use: Yes     Comment: social     family history includes Breast cancer (age of onset: 50) in her sister.    Current Outpatient Medications:     ascorbic acid (Vitamin C) 250 mg tablet, Take by mouth., Disp: , Rfl:     cholecalciferol (Vitamin D-3) 25 MCG (1000 UT) capsule, Take by mouth., Disp: , Rfl:     folic acid (Folvite) 1 mg tablet, Take 1 tablet (1 mg) by mouth once daily. Except the day you take methotrexate, Disp: 30 tablet, Rfl: 11    hydroxychloroquine (Plaquenil) 200 mg tablet, Take 1 tablet (200 mg) by mouth every other day AND 2 tablets (400 mg) every other day., Disp: 135 tablet, Rfl: 3    loratadine (Claritin) 10 mg tablet, Take 1 tablet (10 mg) by mouth once daily., Disp: , Rfl:     methotrexate (Trexall) 2.5 mg tablet, Take 4 tablets (10 mg total) by mouth 1 (one) time per  week., Disp: 16 tablet, Rfl: 3    metroNIDAZOLE (Metrogel) 1 % gel, APPLY TO FACE ONCE OR TWICE DAILY FOR ROSCEA, Disp: , Rfl:     multivitamin (Daily Multi-Vitamin) tablet, Take by mouth., Disp: , Rfl:     NON FORMULARY, B-12, Disp: , Rfl:     omeprazole (PriLOSEC) 40 mg DR capsule, Take 1 capsule (40 mg) by mouth once daily., Disp: 90 capsule, Rfl: 0    rosuvastatin (Crestor) 40 mg tablet, Take 1 tablet (40 mg) by mouth once daily., Disp: 90 tablet, Rfl: 1    sertraline (Zoloft) 50 mg tablet, 1 a day, Disp: 90 tablet, Rfl: 1    vitamin E 180 mg (400 unit) capsule, Take 1 capsule (400 Units) by mouth once daily., Disp: , Rfl:   Allergies   Allergen Reactions    Other Unknown     Environmental       Sulfa (Sulfonamide Antibiotics) Other     Swelling, eye ointment     There were no vitals taken for this visit.  Neurological Exam/Physical Exam:    Constitutional: General appearance: no acute distress. Pleasant.   Auscultation of Heart: Regular rate and rhythm, no murmurs, normal S1 and S2.   Carotid Arteries: no bruits  Peripheral Vascular Exam: No swelling in extremities  Mental status: no distress, alert, interactive and cooperative. Affect is appropriate.   Memory: recent memory intact   Attention: normal attention  Language: normal comprehension   Fund of knowledge: Patient displays adequate knowledge of current events.  Eyes: The ophthalmoscopic examination was normal.   Cranial nerve II: Visual fields full to confrontation.   Cranial nerves III, IV, and VI: Pupils round, equally reactive to light; EOMs intact. No nystagmus.   Cranial Nerve V: Facial sensation intact to LT bilaterally.   Cranial nerve VII: no facial droop  Cranial nerve VIII: Hearing is intact  Cranial nerves IX and X: Palate elevates symmetrically.   Cranial nerve XI: Shoulder shrug intact.   Cranial nerve XII: Tongue is midline.  Motor:  Muscle bulk was normal in both upper and lower extremities.    No atrophy.   Strength is normal. Subtle  kinetic tremor in both hands seen on finger to nose testing.   Deep Tendon Reflexes: left biceps 2+ , right biceps 2+, left triceps 2+, right triceps 2+, left brachioradialis 2+, right brachioradialis 2+, left patella 2+, right patella 2+, left ankle jerk 2+, right ankle jerk 2+   Plantar Reflex: Toes downgoing to plantar stimulation on the left. Toes downgoing to plantar stimulation on the right.   Sensory Exam: Normal to vibratory sensation  Coordination:  no limb dystaxia and rapid alternating movements are intact.   Gait:  cautious.         Labs:  CBC:   Lab Results   Component Value Date    WBC 4.4 11/15/2024    HGB 15.5 11/15/2024    HCT 47.1 (H) 11/15/2024     11/15/2024     BMP:   Lab Results   Component Value Date     11/15/2024    K 4.3 11/15/2024     11/15/2024    CO2 30 11/15/2024    BUN 11 11/15/2024    CREATININE 0.83 11/15/2024    CALCIUM 10.0 11/15/2024     LFT:   Lab Results   Component Value Date    ALKPHOS 57 11/15/2024    BILITOT 0.5 11/15/2024    PROT 7.3 11/15/2024    ALBUMIN 4.8 11/15/2024    ALT 26 11/15/2024    AST 21 11/15/2024       Labs were viewed personally.   Kidney function normal.  CBC shows no signs of anemia.  Electrolytes normal.  , mildly elevated.  LFTS are also unremarkable.       Assessment/Plan   Problem List Items Addressed This Visit    None  Visit Diagnoses         Codes    Intermittent tremor     R25.1          May be metabolic, ? Low blood glucose during episodes. ? Dysautonomia.   May be ET.  No signs of parkinsonism.

## 2024-12-10 NOTE — PATIENT INSTRUCTIONS
"It was a pleasure seeing you today.     Please make a follow up appointment in 6 months.  You may also schedule a phone or virtual visit sooner on a Friday morning with me as needed before the next clinic appointment.     For any urgent issues or needing to speak to a medical assistant please call [phone], option 6 during our office hours Monday-Friday 8am-4pm, and leave a voicemail with your concern.  My office will try to reach back you as soon as possible within 24 (business) hours.  If you have an emergency please call 911 or visit a local urgent care or nearest emergency room.      Please understand that [address] is a useful communication tool for simple \"normal\" results or a refill request but I would not recommend using this tool for emergent or urgent issues or for conversations with me.  I am happy to ask my staff to rearrange a follow up visit or a virtual visit sooner than requested if appropriate for your care.    "

## 2024-12-17 DIAGNOSIS — M05.9 SEROPOSITIVE RHEUMATOID ARTHRITIS: ICD-10-CM

## 2024-12-18 RX ORDER — METHOTREXATE 2.5 MG/1
10 TABLET ORAL WEEKLY
Qty: 52 TABLET | Refills: 1 | Status: SHIPPED | OUTPATIENT
Start: 2024-12-18 | End: 2025-06-16

## 2024-12-18 ASSESSMENT — ENCOUNTER SYMPTOMS: TREMORS: 1

## 2025-02-03 ENCOUNTER — APPOINTMENT (OUTPATIENT)
Dept: PRIMARY CARE | Facility: CLINIC | Age: 65
End: 2025-02-03
Payer: MEDICARE

## 2025-02-03 VITALS
SYSTOLIC BLOOD PRESSURE: 124 MMHG | HEART RATE: 74 BPM | OXYGEN SATURATION: 96 % | DIASTOLIC BLOOD PRESSURE: 64 MMHG | WEIGHT: 160 LBS | HEIGHT: 63 IN | BODY MASS INDEX: 28.35 KG/M2 | TEMPERATURE: 98.4 F

## 2025-02-03 DIAGNOSIS — F41.9 ANXIETY: ICD-10-CM

## 2025-02-03 DIAGNOSIS — I10 HYPERTENSION, UNSPECIFIED TYPE: ICD-10-CM

## 2025-02-03 DIAGNOSIS — R73.9 HYPERGLYCEMIA: ICD-10-CM

## 2025-02-03 DIAGNOSIS — Z86.69 HISTORY OF TREMOR: ICD-10-CM

## 2025-02-03 DIAGNOSIS — K21.9 GASTROESOPHAGEAL REFLUX DISEASE, UNSPECIFIED WHETHER ESOPHAGITIS PRESENT: ICD-10-CM

## 2025-02-03 DIAGNOSIS — E55.9 VITAMIN D DEFICIENCY: ICD-10-CM

## 2025-02-03 DIAGNOSIS — M06.9 RHEUMATOID ARTHRITIS, INVOLVING UNSPECIFIED SITE, UNSPECIFIED WHETHER RHEUMATOID FACTOR PRESENT (MULTI): ICD-10-CM

## 2025-02-03 DIAGNOSIS — Z12.31 ENCOUNTER FOR SCREENING MAMMOGRAM FOR MALIGNANT NEOPLASM OF BREAST: ICD-10-CM

## 2025-02-03 DIAGNOSIS — E78.00 HYPERCHOLESTEREMIA: ICD-10-CM

## 2025-02-03 DIAGNOSIS — M81.0 OSTEOPOROSIS, UNSPECIFIED OSTEOPOROSIS TYPE, UNSPECIFIED PATHOLOGICAL FRACTURE PRESENCE: ICD-10-CM

## 2025-02-03 DIAGNOSIS — Z00.00 ENCOUNTER FOR SUBSEQUENT ANNUAL WELLNESS VISIT (AWV) IN MEDICARE PATIENT: Primary | ICD-10-CM

## 2025-02-03 PROCEDURE — 1170F FXNL STATUS ASSESSED: CPT | Performed by: INTERNAL MEDICINE

## 2025-02-03 PROCEDURE — G0439 PPPS, SUBSEQ VISIT: HCPCS | Performed by: INTERNAL MEDICINE

## 2025-02-03 PROCEDURE — 1036F TOBACCO NON-USER: CPT | Performed by: INTERNAL MEDICINE

## 2025-02-03 PROCEDURE — 3074F SYST BP LT 130 MM HG: CPT | Performed by: INTERNAL MEDICINE

## 2025-02-03 PROCEDURE — 3078F DIAST BP <80 MM HG: CPT | Performed by: INTERNAL MEDICINE

## 2025-02-03 PROCEDURE — 1124F ACP DISCUSS-NO DSCNMKR DOCD: CPT | Performed by: INTERNAL MEDICINE

## 2025-02-03 PROCEDURE — 3008F BODY MASS INDEX DOCD: CPT | Performed by: INTERNAL MEDICINE

## 2025-02-03 PROCEDURE — 1159F MED LIST DOCD IN RCRD: CPT | Performed by: INTERNAL MEDICINE

## 2025-02-03 PROCEDURE — G2211 COMPLEX E/M VISIT ADD ON: HCPCS | Performed by: INTERNAL MEDICINE

## 2025-02-03 PROCEDURE — 99214 OFFICE O/P EST MOD 30 MIN: CPT | Performed by: INTERNAL MEDICINE

## 2025-02-03 PROCEDURE — 1160F RVW MEDS BY RX/DR IN RCRD: CPT | Performed by: INTERNAL MEDICINE

## 2025-02-03 RX ORDER — SERTRALINE HYDROCHLORIDE 50 MG/1
TABLET, FILM COATED ORAL
Qty: 90 TABLET | Refills: 1 | Status: SHIPPED | OUTPATIENT
Start: 2025-02-03

## 2025-02-03 RX ORDER — ROSUVASTATIN CALCIUM 40 MG/1
40 TABLET, COATED ORAL DAILY
Qty: 90 TABLET | Refills: 0 | Status: SHIPPED | OUTPATIENT
Start: 2025-02-03

## 2025-02-03 RX ORDER — OMEPRAZOLE 40 MG/1
40 CAPSULE, DELAYED RELEASE ORAL DAILY
Qty: 90 CAPSULE | Refills: 1 | Status: SHIPPED | OUTPATIENT
Start: 2025-02-03

## 2025-02-03 ASSESSMENT — PATIENT HEALTH QUESTIONNAIRE - PHQ9
2. FEELING DOWN, DEPRESSED OR HOPELESS: NOT AT ALL
SUM OF ALL RESPONSES TO PHQ9 QUESTIONS 1 AND 2: 0
1. LITTLE INTEREST OR PLEASURE IN DOING THINGS: NOT AT ALL

## 2025-02-03 ASSESSMENT — ACTIVITIES OF DAILY LIVING (ADL)
DOING_HOUSEWORK: INDEPENDENT
GROCERY_SHOPPING: INDEPENDENT
TAKING_MEDICATION: INDEPENDENT
MANAGING_FINANCES: INDEPENDENT
DRESSING: INDEPENDENT
BATHING: INDEPENDENT

## 2025-02-03 ASSESSMENT — ENCOUNTER SYMPTOMS
DEPRESSION: 0
OCCASIONAL FEELINGS OF UNSTEADINESS: 0
LOSS OF SENSATION IN FEET: 0

## 2025-02-03 NOTE — PROGRESS NOTES
"Subjective   Reason for Visit: Zeynep Winters is an 64 y.o. female here for a Medicare Wellness visit, yearly physical and follow up    Past Medical, Surgical, and Family History reviewed and updated in chart.    Reviewed all medications by prescribing practitioner or clinical pharmacist (such as prescriptions, OTCs, herbal therapies and supplements) and documented in the medical record.    HPI    Medicare wellness    Yearly physical    mammogram 4-24  Dexa 4-23 osteoporosis  Colonoscopy at 56 yrs  due at 66 yrs  CT chest lung cancer screening n/a  GYN n/a  immunizations rev'd flu, COVID  BMI 28.3    Follow up    Tremors lessen / resolve later in the day  Improve with better sleep  Neuro following    Hypertension on diet  Home readings rev'd and acceptable  Gets anxious easily  Continue zoloft feels better  no side effects  EKG SR 69 2-24     face rosacea   sees derm     GERD stable on rx no side effects     RA stable on rx no side effects  osteoporosis  sees rheum     cholesterol stable on rx no side effects     vit D stable on rx no side effects     Dense breast tissue on mammogram rev'd  Fast MRI ordered     diet / exercise rev'd    Patient Care Team:  Kathia Harvey MD as PCP - General (Internal Medicine)  Kathia Harvey MD as PCP - [facility] Attributed Provider     Review of Systems   All other systems reviewed and are negative.      Objective   Vitals:  /64   Pulse 74   Temp 36.9 °C (98.4 °F)   Ht 1.6 m (5' 3\")   Wt 72.6 kg (160 lb)   SpO2 96%   BMI 28.34 kg/m²       Physical Exam  Vitals reviewed.   Constitutional:       Appearance: Normal appearance. She is normal weight.   HENT:      Head: Normocephalic and atraumatic.      Mouth/Throat:      Pharynx: No posterior oropharyngeal erythema.   Eyes:      General: No scleral icterus.     Conjunctiva/sclera: Conjunctivae normal.      Pupils: Pupils are equal, round, and reactive to light.   Cardiovascular:      Rate and Rhythm: Normal rate " and regular rhythm.      Heart sounds: Normal heart sounds.   Pulmonary:      Effort: No respiratory distress.      Breath sounds: No wheezing.   Abdominal:      General: Abdomen is flat. Bowel sounds are normal. There is no distension.      Palpations: Abdomen is soft. There is no mass.      Tenderness: There is no abdominal tenderness. There is no rebound.   Musculoskeletal:         General: Normal range of motion.      Cervical back: Normal range of motion and neck supple.   Skin:     General: Skin is warm and dry.   Neurological:      General: No focal deficit present.      Mental Status: She is alert and oriented to person, place, and time. Mental status is at baseline.   Psychiatric:         Mood and Affect: Mood normal.         Behavior: Behavior normal.         Thought Content: Thought content normal.         Judgment: Judgment normal.         Assessment & Plan  Encounter for subsequent annual wellness visit (AWV) in Medicare patient         Hypercholesteremia    Orders:    rosuvastatin (Crestor) 40 mg tablet; Take 1 tablet (40 mg) by mouth once daily.    Comprehensive Metabolic Panel; Future    Lipid Panel; Future    TSH with reflex to Free T4 if abnormal; Future    Hypertension, unspecified type         History of tremor         Hyperglycemia    Orders:    Hemoglobin A1C; Future    Rheumatoid arthritis, involving unspecified site, unspecified whether rheumatoid factor present (Multi)    Orders:    CBC and Auto Differential; Future    Gastroesophageal reflux disease, unspecified whether esophagitis present    Orders:    omeprazole (PriLOSEC) 40 mg DR capsule; Take 1 capsule (40 mg) by mouth once daily.    Anxiety    Orders:    sertraline (Zoloft) 50 mg tablet; 1 a day    Vitamin D deficiency    Orders:    Vitamin D 25-Hydroxy,Total (for eval of Vitamin D levels); Future    Encounter for screening mammogram for malignant neoplasm of breast    Orders:    BI mammo bilateral screening tomosynthesis;  Future    Osteoporosis, unspecified osteoporosis type, unspecified pathological fracture presence    Orders:    XR DEXA bone density; Future    BMI 28.0-28.9,adult                     Medicare wellness    Yearly physical    mammogram 4-24  Dexa 4-23 osteoporosis  Colonoscopy at 56 yrs  due at 66 yrs  CT chest lung cancer screening n/a  GYN n/a  immunizations rev'd flu, COVID  BMI 28.3    Follow up    Tremors lessen / resolve later in the day  Improve with better sleep  Neuro following    Hypertension on diet  Home readings rev'd and acceptable  Gets anxious easily  Continue zoloft feels better  no side effects  EKG SR 69 2-24     face rosacea   sees derm     GERD stable on rx no side effects     RA stable on rx no side effects  osteoporosis  sees rheum     cholesterol stable on rx no side effects     vit D stable on rx no side effects     Dense breast tissue on mammogram rev'd  Fast MRI ordered     diet / exercise rev'd    Check labs, mammogram and dexa    Follow up 6 months

## 2025-03-17 ENCOUNTER — APPOINTMENT (OUTPATIENT)
Dept: RHEUMATOLOGY | Facility: CLINIC | Age: 65
End: 2025-03-17
Payer: MEDICARE

## 2025-03-20 ENCOUNTER — TELEPHONE (OUTPATIENT)
Dept: RHEUMATOLOGY | Facility: CLINIC | Age: 65
End: 2025-03-20
Payer: MEDICARE

## 2025-03-20 NOTE — TELEPHONE ENCOUNTER
Pt had a root canal and was prescribed amoxicillin 500 mg and was tod it interferes with methotrexate. Pt wants to know what she should do

## 2025-03-21 NOTE — TELEPHONE ENCOUNTER
I told the pt to hold the methotrexate for the week as the doctor advised pt had no further questions.

## 2025-04-03 DIAGNOSIS — E78.00 HYPERCHOLESTEREMIA: ICD-10-CM

## 2025-04-03 RX ORDER — ROSUVASTATIN CALCIUM 40 MG/1
40 TABLET, COATED ORAL DAILY
Qty: 90 TABLET | Refills: 1 | Status: SHIPPED | OUTPATIENT
Start: 2025-04-03

## 2025-04-07 ENCOUNTER — APPOINTMENT (OUTPATIENT)
Dept: RHEUMATOLOGY | Facility: CLINIC | Age: 65
End: 2025-04-07
Payer: MEDICARE

## 2025-04-07 VITALS
OXYGEN SATURATION: 96 % | HEIGHT: 63 IN | WEIGHT: 159 LBS | SYSTOLIC BLOOD PRESSURE: 148 MMHG | DIASTOLIC BLOOD PRESSURE: 86 MMHG | HEART RATE: 80 BPM | BODY MASS INDEX: 28.17 KG/M2

## 2025-04-07 DIAGNOSIS — M05.9 SEROPOSITIVE RHEUMATOID ARTHRITIS: ICD-10-CM

## 2025-04-07 DIAGNOSIS — Z79.899 ENCOUNTER FOR LONG-TERM (CURRENT) USE OF MEDICATIONS: Primary | ICD-10-CM

## 2025-04-07 PROCEDURE — 3008F BODY MASS INDEX DOCD: CPT | Performed by: INTERNAL MEDICINE

## 2025-04-07 PROCEDURE — G2211 COMPLEX E/M VISIT ADD ON: HCPCS | Performed by: INTERNAL MEDICINE

## 2025-04-07 PROCEDURE — 1036F TOBACCO NON-USER: CPT | Performed by: INTERNAL MEDICINE

## 2025-04-07 PROCEDURE — 1126F AMNT PAIN NOTED NONE PRSNT: CPT | Performed by: INTERNAL MEDICINE

## 2025-04-07 PROCEDURE — 1159F MED LIST DOCD IN RCRD: CPT | Performed by: INTERNAL MEDICINE

## 2025-04-07 PROCEDURE — 99214 OFFICE O/P EST MOD 30 MIN: CPT | Performed by: INTERNAL MEDICINE

## 2025-04-07 RX ORDER — HYDROXYCHLOROQUINE SULFATE 200 MG/1
TABLET, FILM COATED ORAL
Qty: 135 TABLET | Refills: 3 | Status: SHIPPED | OUTPATIENT
Start: 2025-04-07

## 2025-04-07 ASSESSMENT — ENCOUNTER SYMPTOMS
TREMORS: 1
SHORTNESS OF BREATH: 0
FATIGUE: 0
LOSS OF SENSATION IN FEET: 0
ABDOMINAL PAIN: 0
OCCASIONAL FEELINGS OF UNSTEADINESS: 0
DEPRESSION: 0

## 2025-04-07 ASSESSMENT — PAIN SCALES - GENERAL: PAINLEVEL_OUTOF10: 0-NO PAIN

## 2025-04-07 ASSESSMENT — LIFESTYLE VARIABLES
HOW MANY STANDARD DRINKS CONTAINING ALCOHOL DO YOU HAVE ON A TYPICAL DAY: PATIENT DOES NOT DRINK
HOW OFTEN DO YOU HAVE A DRINK CONTAINING ALCOHOL: NEVER
HOW OFTEN DO YOU HAVE SIX OR MORE DRINKS ON ONE OCCASION: NEVER
AUDIT-C TOTAL SCORE: 0
SKIP TO QUESTIONS 9-10: 1

## 2025-04-07 ASSESSMENT — PATIENT HEALTH QUESTIONNAIRE - PHQ9
2. FEELING DOWN, DEPRESSED OR HOPELESS: NOT AT ALL
SUM OF ALL RESPONSES TO PHQ9 QUESTIONS 1 & 2: 0
1. LITTLE INTEREST OR PLEASURE IN DOING THINGS: NOT AT ALL

## 2025-04-07 NOTE — PROGRESS NOTES
Subjective   Patient ID: Zeynep Winters is a 65 y.o. female who presents for Follow-up.  HPI  Patient with history of seropositive rheumatoid arthritis.    Patient was last seen in November and at that time we had her continue with methotrexate and hydroxychloroquine.  She was able to finish all of her holiday crafts.  She is currently doing a quilt and knitting a sweater.  Will be doing a lot more yard work soon.  Has not had any significant infections.  Her hands have been doing okay.  She did get the flu shot in February.    The weather sometimes can affect her.  Review of Systems   Constitutional:  Negative for fatigue.   Eyes:         Gets her eyes checked twice a year   Respiratory:  Negative for shortness of breath.    Cardiovascular:  Negative for chest pain.   Gastrointestinal:  Negative for abdominal pain.   Musculoskeletal:         Per HPI   Neurological:  Positive for tremors.   Psychiatric/Behavioral:          Sleeping better       Objective   Physical Exam  GEN: NAD A&O x3 appropriate affect  HEENT: no enlarged glands or thyroid  LYMPH: no cervical lymphadenopathy  SKIN: no rashes  PULSES: +radials  TENDER POINTS: 0/18   MSK:   No current tenderness in DIP PIP MCP wrist elbows shoulders CMC hypertrophy.    Assessment/Plan     Seropositive rheumatoid arthritis   - I feel she is currently stable on her regimen of methotrexate and hydroxychloroquine.  Will have her do blood work to monitor drug toxicity and disease activity.  Will have her come back again in 4 to 5 months or as needed.  Maribel Flores MD 04/07/25 11:47 AM

## 2025-04-10 ENCOUNTER — HOSPITAL ENCOUNTER (OUTPATIENT)
Dept: RADIOLOGY | Facility: HOSPITAL | Age: 65
Discharge: HOME | End: 2025-04-10
Payer: MEDICARE

## 2025-04-10 VITALS — HEIGHT: 63 IN | WEIGHT: 157 LBS | BODY MASS INDEX: 27.82 KG/M2

## 2025-04-10 DIAGNOSIS — M81.0 OSTEOPOROSIS, UNSPECIFIED OSTEOPOROSIS TYPE, UNSPECIFIED PATHOLOGICAL FRACTURE PRESENCE: ICD-10-CM

## 2025-04-10 DIAGNOSIS — Z12.31 ENCOUNTER FOR SCREENING MAMMOGRAM FOR MALIGNANT NEOPLASM OF BREAST: ICD-10-CM

## 2025-04-10 PROCEDURE — 77080 DXA BONE DENSITY AXIAL: CPT

## 2025-04-10 PROCEDURE — 77063 BREAST TOMOSYNTHESIS BI: CPT | Performed by: RADIOLOGY

## 2025-04-10 PROCEDURE — 77067 SCR MAMMO BI INCL CAD: CPT | Performed by: RADIOLOGY

## 2025-04-10 PROCEDURE — 77080 DXA BONE DENSITY AXIAL: CPT | Performed by: RADIOLOGY

## 2025-04-10 PROCEDURE — 77063 BREAST TOMOSYNTHESIS BI: CPT

## 2025-06-04 ENCOUNTER — APPOINTMENT (OUTPATIENT)
Dept: NEUROLOGY | Facility: CLINIC | Age: 65
End: 2025-06-04
Payer: MEDICARE

## 2025-06-10 ENCOUNTER — APPOINTMENT (OUTPATIENT)
Dept: NEUROLOGY | Facility: CLINIC | Age: 65
End: 2025-06-10
Payer: MEDICARE

## 2025-06-17 LAB
25(OH)D3+25(OH)D2 SERPL-MCNC: 53 NG/ML (ref 30–100)
ALBUMIN SERPL-MCNC: 4.7 G/DL (ref 3.6–5.1)
ALBUMIN SERPL-MCNC: 4.7 G/DL (ref 3.6–5.1)
ALP SERPL-CCNC: 57 U/L (ref 37–153)
ALP SERPL-CCNC: 58 U/L (ref 37–153)
ALT SERPL-CCNC: 28 U/L (ref 6–29)
ALT SERPL-CCNC: 29 U/L (ref 6–29)
ANION GAP SERPL CALCULATED.4IONS-SCNC: 10 MMOL/L (CALC) (ref 7–17)
ANION GAP SERPL CALCULATED.4IONS-SCNC: 10 MMOL/L (CALC) (ref 7–17)
AST SERPL-CCNC: 26 U/L (ref 10–35)
AST SERPL-CCNC: 26 U/L (ref 10–35)
BASOPHILS # BLD AUTO: 20 CELLS/UL (ref 0–200)
BASOPHILS NFR BLD AUTO: 0.6 %
BILIRUB SERPL-MCNC: 0.6 MG/DL (ref 0.2–1.2)
BILIRUB SERPL-MCNC: 0.6 MG/DL (ref 0.2–1.2)
BUN SERPL-MCNC: 14 MG/DL (ref 7–25)
BUN SERPL-MCNC: 14 MG/DL (ref 7–25)
CALCIUM SERPL-MCNC: 9.5 MG/DL (ref 8.6–10.4)
CALCIUM SERPL-MCNC: 9.5 MG/DL (ref 8.6–10.4)
CHLORIDE SERPL-SCNC: 106 MMOL/L (ref 98–110)
CHLORIDE SERPL-SCNC: 106 MMOL/L (ref 98–110)
CHOLEST SERPL-MCNC: 196 MG/DL
CHOLEST/HDLC SERPL: 3.6 (CALC)
CO2 SERPL-SCNC: 27 MMOL/L (ref 20–32)
CO2 SERPL-SCNC: 27 MMOL/L (ref 20–32)
CREAT SERPL-MCNC: 0.85 MG/DL (ref 0.5–1.05)
CREAT SERPL-MCNC: 0.85 MG/DL (ref 0.5–1.05)
CRP SERPL-MCNC: <3 MG/L
EGFRCR SERPLBLD CKD-EPI 2021: 75 ML/MIN/1.73M2
EGFRCR SERPLBLD CKD-EPI 2021: 75 ML/MIN/1.73M2
EOSINOPHIL # BLD AUTO: 40 CELLS/UL (ref 15–500)
EOSINOPHIL NFR BLD AUTO: 1.2 %
ERYTHROCYTE [DISTWIDTH] IN BLOOD BY AUTOMATED COUNT: 13.6 % (ref 11–15)
ERYTHROCYTE [DISTWIDTH] IN BLOOD BY AUTOMATED COUNT: 14 % (ref 11–15)
ERYTHROCYTE [SEDIMENTATION RATE] IN BLOOD BY WESTERGREN METHOD: 2 MM/H
EST. AVERAGE GLUCOSE BLD GHB EST-MCNC: 117 MG/DL
EST. AVERAGE GLUCOSE BLD GHB EST-SCNC: 6.5 MMOL/L
GLUCOSE SERPL-MCNC: 101 MG/DL (ref 65–99)
GLUCOSE SERPL-MCNC: 102 MG/DL (ref 65–99)
HBA1C MFR BLD: 5.7 %
HCT VFR BLD AUTO: 47.6 % (ref 35–45)
HCT VFR BLD AUTO: 47.8 % (ref 35–45)
HDLC SERPL-MCNC: 54 MG/DL
HGB BLD-MCNC: 15.7 G/DL (ref 11.7–15.5)
HGB BLD-MCNC: 15.8 G/DL (ref 11.7–15.5)
LDLC SERPL CALC-MCNC: 113 MG/DL (CALC)
LYMPHOCYTES # BLD AUTO: 983 CELLS/UL (ref 850–3900)
LYMPHOCYTES NFR BLD AUTO: 29.8 %
MCH RBC QN AUTO: 30.8 PG (ref 27–33)
MCH RBC QN AUTO: 30.9 PG (ref 27–33)
MCHC RBC AUTO-ENTMCNC: 33 G/DL (ref 32–36)
MCHC RBC AUTO-ENTMCNC: 33.1 G/DL (ref 32–36)
MCV RBC AUTO: 93.3 FL (ref 80–100)
MCV RBC AUTO: 93.5 FL (ref 80–100)
MONOCYTES # BLD AUTO: 162 CELLS/UL (ref 200–950)
MONOCYTES NFR BLD AUTO: 4.9 %
NEUTROPHILS # BLD AUTO: 2096 CELLS/UL (ref 1500–7800)
NEUTROPHILS NFR BLD AUTO: 63.5 %
NONHDLC SERPL-MCNC: 142 MG/DL (CALC)
PLATELET # BLD AUTO: 143 THOUSAND/UL (ref 140–400)
PLATELET # BLD AUTO: 150 THOUSAND/UL (ref 140–400)
PMV BLD REES-ECKER: 10.8 FL (ref 7.5–12.5)
PMV BLD REES-ECKER: 11.1 FL (ref 7.5–12.5)
POTASSIUM SERPL-SCNC: 4.2 MMOL/L (ref 3.5–5.3)
POTASSIUM SERPL-SCNC: 4.2 MMOL/L (ref 3.5–5.3)
PROT SERPL-MCNC: 6.9 G/DL (ref 6.1–8.1)
PROT SERPL-MCNC: 6.9 G/DL (ref 6.1–8.1)
RBC # BLD AUTO: 5.1 MILLION/UL (ref 3.8–5.1)
RBC # BLD AUTO: 5.11 MILLION/UL (ref 3.8–5.1)
SODIUM SERPL-SCNC: 143 MMOL/L (ref 135–146)
SODIUM SERPL-SCNC: 143 MMOL/L (ref 135–146)
TRIGL SERPL-MCNC: 169 MG/DL
TSH SERPL-ACNC: 0.99 MIU/L (ref 0.4–4.5)
WBC # BLD AUTO: 3.3 THOUSAND/UL (ref 3.8–10.8)
WBC # BLD AUTO: 3.4 THOUSAND/UL (ref 3.8–10.8)

## 2025-07-01 DIAGNOSIS — M05.9 SEROPOSITIVE RHEUMATOID ARTHRITIS: ICD-10-CM

## 2025-07-02 RX ORDER — METHOTREXATE 2.5 MG/1
TABLET ORAL
Qty: 52 TABLET | Refills: 1 | Status: SHIPPED | OUTPATIENT
Start: 2025-07-02

## 2025-08-05 ENCOUNTER — APPOINTMENT (OUTPATIENT)
Dept: PRIMARY CARE | Facility: CLINIC | Age: 65
End: 2025-08-05
Payer: MEDICARE

## 2025-08-05 VITALS
TEMPERATURE: 97.7 F | DIASTOLIC BLOOD PRESSURE: 68 MMHG | OXYGEN SATURATION: 98 % | BODY MASS INDEX: 28.24 KG/M2 | WEIGHT: 159.4 LBS | HEART RATE: 80 BPM | SYSTOLIC BLOOD PRESSURE: 120 MMHG

## 2025-08-05 DIAGNOSIS — Z71.2 ENCOUNTER TO DISCUSS TEST RESULTS: Primary | ICD-10-CM

## 2025-08-05 DIAGNOSIS — I10 HYPERTENSION, UNSPECIFIED TYPE: ICD-10-CM

## 2025-08-05 DIAGNOSIS — E78.00 HYPERCHOLESTEREMIA: ICD-10-CM

## 2025-08-05 DIAGNOSIS — F41.9 ANXIETY: ICD-10-CM

## 2025-08-05 DIAGNOSIS — R73.9 HYPERGLYCEMIA: ICD-10-CM

## 2025-08-05 DIAGNOSIS — K21.9 GASTROESOPHAGEAL REFLUX DISEASE, UNSPECIFIED WHETHER ESOPHAGITIS PRESENT: ICD-10-CM

## 2025-08-05 DIAGNOSIS — E55.9 VITAMIN D DEFICIENCY: ICD-10-CM

## 2025-08-05 DIAGNOSIS — M06.9 RHEUMATOID ARTHRITIS, INVOLVING UNSPECIFIED SITE, UNSPECIFIED WHETHER RHEUMATOID FACTOR PRESENT (MULTI): ICD-10-CM

## 2025-08-05 PROCEDURE — 1160F RVW MEDS BY RX/DR IN RCRD: CPT | Performed by: INTERNAL MEDICINE

## 2025-08-05 PROCEDURE — G0446 INTENS BEHAVE THER CARDIO DX: HCPCS | Performed by: INTERNAL MEDICINE

## 2025-08-05 PROCEDURE — G2211 COMPLEX E/M VISIT ADD ON: HCPCS | Performed by: INTERNAL MEDICINE

## 2025-08-05 PROCEDURE — 99214 OFFICE O/P EST MOD 30 MIN: CPT | Performed by: INTERNAL MEDICINE

## 2025-08-05 PROCEDURE — 3078F DIAST BP <80 MM HG: CPT | Performed by: INTERNAL MEDICINE

## 2025-08-05 PROCEDURE — 3074F SYST BP LT 130 MM HG: CPT | Performed by: INTERNAL MEDICINE

## 2025-08-05 PROCEDURE — 1036F TOBACCO NON-USER: CPT | Performed by: INTERNAL MEDICINE

## 2025-08-05 PROCEDURE — 1159F MED LIST DOCD IN RCRD: CPT | Performed by: INTERNAL MEDICINE

## 2025-08-05 RX ORDER — SERTRALINE HYDROCHLORIDE 50 MG/1
TABLET, FILM COATED ORAL
Qty: 90 TABLET | Refills: 1 | Status: SHIPPED | OUTPATIENT
Start: 2025-08-05

## 2025-08-05 RX ORDER — ROSUVASTATIN CALCIUM 40 MG/1
40 TABLET, COATED ORAL DAILY
Qty: 90 TABLET | Refills: 1 | Status: SHIPPED | OUTPATIENT
Start: 2025-08-05

## 2025-08-05 NOTE — PROGRESS NOTES
Subjective   Patient ID: Zeynep Winters is a 65 y.o. female who presents for Follow-up (6 month ).  HPI     routine follow up    Tests rev'd    Feels well    Tremors resolved with sleeping in spare bedroom and sleeping thru the night     Hypertension on diet  Home readings rev'd and acceptable  Gets anxious easily  Continue zoloft feels better  no side effects  EKG SR 69 2-24    Hyperglycemia  Diet discussed  HBA1C 5.7      6-25     face rosacea   sees derm     GERD stable on rx no side effects     RA stable on rx no side effects  osteoporosis  sees rheum     cholesterol stable on rx no side effects    Anxiety stable on rx no side effects     vit D stable on rx no side effects     Dense breast tissue on mammogram rev'd  Fast MRI ordered     diet / exercise rev'd    Review of Systems   All other systems reviewed and are negative.      Objective   /68   Pulse 80   Temp 36.5 °C (97.7 °F)   Wt 72.3 kg (159 lb 6.4 oz)   SpO2 98%   BMI 28.24 kg/m²   Lab Results   Component Value Date    WBC 3.3 (L) 06/16/2025    HGB 15.7 (H) 06/16/2025    HCT 47.6 (H) 06/16/2025     06/16/2025    CHOL 196 06/16/2025    TRIG 169 (H) 06/16/2025    HDL 54 06/16/2025    ALT 29 06/16/2025    AST 26 06/16/2025     06/16/2025    K 4.2 06/16/2025     06/16/2025    CREATININE 0.85 06/16/2025    BUN 14 06/16/2025    CO2 27 06/16/2025    TSH 0.99 06/16/2025    HGBA1C 5.7 (H) 06/16/2025           Physical Exam  Vitals reviewed.   Constitutional:       Appearance: Normal appearance. She is normal weight.   HENT:      Head: Normocephalic and atraumatic.      Mouth/Throat:      Pharynx: No posterior oropharyngeal erythema.     Eyes:      General: No scleral icterus.     Conjunctiva/sclera: Conjunctivae normal.      Pupils: Pupils are equal, round, and reactive to light.       Cardiovascular:      Rate and Rhythm: Normal rate and regular rhythm.      Heart sounds: Normal heart sounds.   Pulmonary:      Effort: No respiratory  distress.      Breath sounds: No wheezing.   Abdominal:      General: Abdomen is flat. Bowel sounds are normal. There is no distension.      Palpations: Abdomen is soft. There is no mass.      Tenderness: There is no abdominal tenderness. There is no rebound.     Musculoskeletal:         General: Normal range of motion.      Cervical back: Normal range of motion and neck supple.     Skin:     General: Skin is warm and dry.     Neurological:      General: No focal deficit present.      Mental Status: She is alert and oriented to person, place, and time. Mental status is at baseline.     Psychiatric:         Mood and Affect: Mood normal.         Behavior: Behavior normal.         Thought Content: Thought content normal.         Judgment: Judgment normal.         Problem List Items Addressed This Visit           ICD-10-CM    GERD (gastroesophageal reflux disease) K21.9    Hyperglycemia R73.9    RA (rheumatoid arthritis) M06.9    Vitamin D deficiency E55.9     Other Visit Diagnoses         Codes      Encounter to discuss test results    -  Primary Z71.2      Hypercholesteremia     E78.00    Relevant Medications    rosuvastatin (Crestor) 40 mg tablet      Hypertension, unspecified type     I10      Anxiety     F41.9    Relevant Medications    sertraline (Zoloft) 50 mg tablet      BMI 28.0-28.9,adult     Z68.28          Assessment/Plan       Tests rev'd    Feels well    Tremors resolved with sleeping in spare bedroom and sleeping thru the night     Hypertension on diet  Home readings rev'd and acceptable  Gets anxious easily  Continue zoloft feels better  no side effects  EKG SR 69 2-24    Hyperglycemia  Diet discussed  HBA1C 5.7      6-25     face rosacea   sees derm     GERD stable on rx no side effects     RA stable on rx no side effects  osteoporosis  sees rheum     cholesterol stable on rx no side effects  I spent 15 minutes face-to-face with this individual discussing their cardiovascular risk and behavioral  therapies of nutritional choices, exercise, and elimination of habits contributing to risk. We agreed on plan how they may be able to reduce their current cardiovascular risk.  Patient 10 year cardiac risk estimate calculates :  6.1  %     Anxiety stable on rx no side effects     vit D stable on rx no side effects     Dense breast tissue on mammogram rev'd  Fast MRI ordered     diet / exercise rev'd    mammogram 4-25  Dexa 4-25 osteoporosis  Colonoscopy at 56 yrs  due at 66 yrs  CT chest lung cancer screening n/a  GYN n/a  immunizations rev'd flu, COVID  BMI 28.2    Follow up 3-26 / medicare physical

## 2025-10-06 ENCOUNTER — APPOINTMENT (OUTPATIENT)
Dept: RHEUMATOLOGY | Facility: CLINIC | Age: 65
End: 2025-10-06
Payer: MEDICARE

## 2026-01-27 ENCOUNTER — APPOINTMENT (OUTPATIENT)
Dept: PRIMARY CARE | Facility: CLINIC | Age: 66
End: 2026-01-27
Payer: MEDICARE